# Patient Record
Sex: FEMALE | Race: BLACK OR AFRICAN AMERICAN | Employment: OTHER | URBAN - METROPOLITAN AREA
[De-identification: names, ages, dates, MRNs, and addresses within clinical notes are randomized per-mention and may not be internally consistent; named-entity substitution may affect disease eponyms.]

---

## 2019-02-19 ENCOUNTER — OFFICE VISIT (OUTPATIENT)
Dept: ORTHOPEDIC SURGERY | Facility: CLINIC | Age: 84
End: 2019-02-19

## 2019-02-19 VITALS
DIASTOLIC BLOOD PRESSURE: 71 MMHG | BODY MASS INDEX: 30 KG/M2 | SYSTOLIC BLOOD PRESSURE: 131 MMHG | WEIGHT: 163 LBS | TEMPERATURE: 96.9 F | HEART RATE: 87 BPM | OXYGEN SATURATION: 97 % | HEIGHT: 62 IN | RESPIRATION RATE: 16 BRPM

## 2019-02-19 DIAGNOSIS — G56.01 CARPAL TUNNEL SYNDROME, RIGHT: Primary | ICD-10-CM

## 2019-02-19 RX ORDER — PRAVASTATIN SODIUM 10 MG/1
TABLET ORAL
COMMUNITY
Start: 2018-12-03 | End: 2020-10-06 | Stop reason: SDUPTHER

## 2019-02-19 RX ORDER — VITAMINS A AND D
CAPSULE ORAL
COMMUNITY

## 2019-02-19 RX ORDER — SERTRALINE HYDROCHLORIDE 50 MG/1
TABLET, FILM COATED ORAL
COMMUNITY
Start: 2018-09-10 | End: 2020-09-15 | Stop reason: SDUPTHER

## 2019-02-19 RX ORDER — LANCETS
1 EACH MISCELLANEOUS
COMMUNITY
Start: 2014-10-27

## 2019-02-19 RX ORDER — BLOOD-GLUCOSE METER
EACH MISCELLANEOUS
COMMUNITY

## 2019-02-19 RX ORDER — TRIPROLIDINE/PSEUDOEPHEDRINE 2.5MG-60MG
1500 TABLET ORAL
COMMUNITY

## 2019-02-19 RX ORDER — METFORMIN HYDROCHLORIDE 850 MG/1
850 TABLET ORAL
COMMUNITY
Start: 2017-09-05 | End: 2019-08-13 | Stop reason: SDUPTHER

## 2019-02-19 RX ORDER — LANOLIN ALCOHOL/MO/W.PET/CERES
1000 CREAM (GRAM) TOPICAL
COMMUNITY

## 2019-02-19 RX ORDER — MECLIZINE HYDROCHLORIDE 25 MG/1
25 TABLET ORAL
COMMUNITY

## 2019-02-19 RX ORDER — PRAVASTATIN SODIUM 10 MG/1
TABLET ORAL
Refills: 0 | COMMUNITY
Start: 2018-12-03 | End: 2019-08-13 | Stop reason: SDUPTHER

## 2019-02-19 RX ORDER — ASPIRIN 325 MG
500 TABLET, DELAYED RELEASE (ENTERIC COATED) ORAL
COMMUNITY

## 2019-02-19 NOTE — PROGRESS NOTES
Lilia Justice is a 81 y.o. female right handed retiree former school . Worker's Compensation and legal considerations: not known. Vitals:  
 02/19/19 1318 BP: 131/71 Pulse: 87 Resp: 16 Temp: 96.9 °F (36.1 °C) TempSrc: Oral  
SpO2: 97% Weight: 163 lb (73.9 kg) Height: 5' 2\" (1.575 m) PainSc:   8 PainLoc: Hand Chief Complaint Patient presents with  
 Hand Pain Right hand pain HPI: Patient comes in today complaining of right hand numbness burning and pain. She reports is been going on for close to 2 weeks. She also reports a history of this in the past that she received injections for. She also has a brace at home that she has not been wearing but she is to wear in the past.  She says it is in good condition. She denies any injuries. Date of onset: Many years but most recently February 2019 Injury: No 
 
Prior Treatment:  Yes: Comment: She has had injections in the past by Dr. Breezy Wyatt at least 3-4 years ago she said Numbness/ Tingling: Yes: Comment: Right index middle and ring finger with no numbness on the left. ROS: Review of Systems - General ROS: negative Respiratory ROS: no cough, shortness of breath, or wheezing Cardiovascular ROS: no chest pain or dyspnea on exertion Musculoskeletal ROS: positive for - pain in hand - right Neurological ROS: positive for - numbness/tingling Dermatological ROS: negative History reviewed. No pertinent past medical history. History reviewed. No pertinent surgical history. Current Outpatient Medications Medication Sig Dispense Refill  glucose blood VI test strips (ASCENSIA AUTODISC VI, ONE TOUCH ULTRA TEST VI) strip 1 Each.  Blood-Glucose Meter misc by Misc. (Non-Drug; Combo Route) route.  cyanocobalamin 1,000 mcg tablet 1,000 mcg.  Glucosamine HCl 1,500 mg tab 1,500 mg.    
 lancets misc 1 Each.  meclizine (ANTIVERT) 25 mg tablet 25 mg.  metFORMIN (GLUCOPHAGE) 850 mg tablet 850 mg.    
 omega 3-dha-epa-fish oil (FISH OIL) 60- mg cap 500 mg.  pravastatin (PRAVACHOL) 10 mg tablet TAKE 1 TABLET BY MOUTH EVERY EVENING  peg 400-propylene glycol (SYSTANE) 0.4-0.3 % drop Instill  in eye.  sertraline (ZOLOFT) 50 mg tablet TAKE 1 TABLET BY MOUTH EVERY DAY  vitamins a & d cap Take  by Mouth Once a Day.  triamcinolone acetonide (KENALOG) 10 mg/mL injection 1 mL by IntraMUSCular route once for 1 dose. 1 Vial 0  
 pravastatin (PRAVACHOL) 10 mg tablet TK 1 T PO QPM  0 No Known Allergies PE:  
 
NEUROVASCULAR: There is significant thenar wasting as well as APB weakness of approximately 2-3 out of 5 on the right. There is no intrinsic wasting or weakness noted. Examination L R Examination L R Carpal Comp. - + Pronator Comp. - -  
Carpal Tinel - + Pronator Tinel - - Phalen's - + Pronator Stress - -  
Cubital Comp. - - Guyon Comp. - -  
Cubital Tinel - - Guyon Tinel - -  
Elbow Hyperflexion - - Adson's - - Spurling's - - SC Comp. - -  
PCB Median abn - - SC Tinel - - Radial Tinel - - IC Comp. - - Digital Tinel - - IC Tinel - - Radial 2-Pt WNL WNL Ulnar 2-Pt WNL WNL Radial Pulse: 2+ Capillary Refill: < 2 sec [place]: Not Performed Digital [place] Gent: Not Performed Imaging:  
 
None indicated today ICD-10-CM ICD-9-CM 1. Carpal tunnel syndrome, right G56.01 354.0 TRIAMCINOLONE ACETONIDE INJ  
   triamcinolone acetonide (KENALOG) 10 mg/mL injection INJECT CARPAL TUNNEL Plan:  
 
I had a discussion with the patient on the likely high-grade severity of her carpal tunnel syndrome considering how long she has been dealing with it as well as the physical exam findings. I told her that it would be a good idea to get a nerve study to quantify this. I also told her that it would be likely she would need surgery.   She said that she does not want surgery and injections have worked in the past.  She also says she does not want the EMG. She says she might be willing to do it later if an injection does not work. She would like an injection today. I told her this option is reasonable if she does not want surgery so we will give her an injection today I also told her that if she needs to come back for another injection we should get a nerve study first. 
 
Follow-up TERESSA Lynch NOTE Chart reviewed for the following: 
 Bill BENITES DO, have reviewed the History, Physical and updated the Allergic reactions for Sissy Field TIME OUT performed immediately prior to start of procedure: 
 Bill BENITES DO, have performed the following reviews on Sissy Field prior to the start of the procedure: 
         
* Patient was identified by name and date of birth * Agreement on procedure being performed was verified * Risks and Benefits explained to the patient * Procedure site verified and marked as necessary * Patient was positioned for comfort * Consent was signed and verified Time: 13:30 Date of procedure: 2/19/2019 Procedure performed by: Fox Teresa DO 
 
Provider assisted by: Christopher Naik LPN Patient assisted by: self How tolerated by patient: tolerated the procedure well with no complications Post Procedural Pain Scale: 0 - No Hurt Comments: none Procedure: After consent was obtained, using sterile technique the carpal tunnel was prepped. Local anesthetic used: 1% lidocaine. Kenalog 5 mg and was then injected and the needle withdrawn. The procedure was well tolerated. The patient is asked to continue to rest the area for a few more days before resuming regular activities. It may be more painful for the first 1-2 days.   Watch for fever, or increased swelling or persistent pain in the joint. Call or return to clinic prn if such symptoms occur or there is failure to improve as anticipated. Plan was reviewed with patient, who verbalized agreement and understanding of the plan

## 2019-02-19 NOTE — PATIENT INSTRUCTIONS
Carpal Tunnel Syndrome: Care Instructions Your Care Instructions Carpal tunnel syndrome is a nerve problem. It can cause tingling, numbness, weakness, or pain in the fingers, thumb, and hand. The median nerve and several tough tissues called tendons run through a space in the wrist called the carpal tunnel. The repeated hand motions used in work and some hobbies and sports can put pressure on the nerve. Pregnancy and several conditions, including diabetes, arthritis, and an underactive thyroid, also can cause carpal tunnel syndrome. You may be able to limit an activity or do it differently to reduce your symptoms. You also can take other steps to feel better. If your symptoms are mild, 1 to 2 weeks of home treatment are likely to ease your pain. Surgery is needed only if other treatments do not work. Follow-up care is a key part of your treatment and safety. Be sure to make and go to all appointments, and call your doctor if you are having problems. It's also a good idea to know your test results and keep a list of the medicines you take. How can you care for yourself at home? · If possible, stop or reduce the activity that causes your symptoms. If you cannot stop the activity, take frequent breaks to rest and stretch or change hand positions to do a task. Try switching hands, such as when using a computer mouse. · Try to avoid bending or twisting your wrists. · Ask your doctor if you can take an over-the-counter pain medicine, such as acetaminophen (Tylenol), ibuprofen (Advil, Motrin), or naproxen (Aleve). Be safe with medicines. Read and follow all instructions on the label. · If your doctor prescribes corticosteroid medicine to help reduce pain and swelling, take it exactly as prescribed. Call your doctor if you think you are having a problem with your medicine. · Put ice or a cold pack on your wrist for 10 to 20 minutes at a time to ease pain. Put a thin cloth between the ice and your skin. · If your doctor or your physical or occupational therapist tells you to wear a wrist splint, wear it as directed to keep your wrist in a neutral position. This also eases pressure on your median nerve. · Ask your doctor whether you should have physical or occupational therapy to learn how to do tasks differently. · Try a yoga class to stretch your muscles and build strength in your hands and wrists. Yoga has been shown to ease carpal tunnel symptoms. To prevent carpal tunnel · When working at a computer, keep your hands and wrists in line with your forearms. Hold your elbows close to your sides. Take a break every 10 to 15 minutes. · Try these exercises: 
? Warm up: Rotate your wrist up, down, and from side to side. Repeat this 4 times. Stretch your fingers far apart, relax them, then stretch them again. Repeat 4 times. Stretch your thumb by pulling it back gently, holding it, and then releasing it. Repeat 4 times. ? Prayer stretch: Start with your palms together in front of your chest just below your chin. Slowly lower your hands toward your waistline while keeping your hands close to your stomach and your palms together until you feel a mild to moderate stretch under your forearms. Hold for 10 to 20 seconds. Repeat 4 times. ? Wrist flexor stretch: Hold your arm in front of you with your palm up. Bend your wrist, pointing your hand toward the floor. With your other hand, gently bend your wrist further until you feel a mild to moderate stretch in your forearm. Hold for 10 to 20 seconds. Repeat 4 times. ? Wrist extensor stretch: Repeat the steps for the wrist flexor stretch, but begin with your extended hand palm down. · Squeeze a rubber exercise ball several times a day to keep your hands and fingers strong. · Avoid holding objects (such as a book) in one position for a long time. When possible, use your whole hand to grasp an object.  Using just the thumb and index finger can put stress on the wrist. 
 · Do not smoke. It can make this condition worse by reducing blood flow to the median nerve. If you need help quitting, talk to your doctor about stop-smoking programs and medicines. These can increase your chances of quitting for good. When should you call for help? Watch closely for changes in your health, and be sure to contact your doctor if: 
  · Your pain or other problems do not get better with home care.  
  · You want more information about physical or occupational therapy.  
  · You have side effects of your corticosteroid medicine, such as: 
? Weight gain. ? Mood changes. ? Trouble sleeping. ? Bruising easily.  
  · You have any other problems with your medicine. Where can you learn more? Go to http://parveen-gerald.info/. Enter R432 in the search box to learn more about \"Carpal Tunnel Syndrome: Care Instructions. \" Current as of: September 20, 2018 Content Version: 11.9 © 4893-1013 [employer]. Care instructions adapted under license by [employer] (which disclaims liability or warranty for this information). If you have questions about a medical condition or this instruction, always ask your healthcare professional. [place] [address] any warranty or liability for your use of this information.

## 2019-08-13 ENCOUNTER — OFFICE VISIT (OUTPATIENT)
Dept: FAMILY MEDICINE CLINIC | Facility: CLINIC | Age: 84
End: 2019-08-13

## 2019-08-13 VITALS
HEART RATE: 74 BPM | DIASTOLIC BLOOD PRESSURE: 80 MMHG | HEIGHT: 62 IN | BODY MASS INDEX: 29.37 KG/M2 | RESPIRATION RATE: 12 BRPM | SYSTOLIC BLOOD PRESSURE: 140 MMHG | WEIGHT: 159.6 LBS | OXYGEN SATURATION: 98 % | TEMPERATURE: 97.6 F

## 2019-08-13 DIAGNOSIS — E78.2 MIXED HYPERLIPIDEMIA: ICD-10-CM

## 2019-08-13 DIAGNOSIS — E55.9 VITAMIN D DEFICIENCY: ICD-10-CM

## 2019-08-13 DIAGNOSIS — E11.8 CONTROLLED TYPE 2 DIABETES MELLITUS WITH COMPLICATION, WITHOUT LONG-TERM CURRENT USE OF INSULIN (HCC): Primary | ICD-10-CM

## 2019-08-13 RX ORDER — METFORMIN HYDROCHLORIDE 850 MG/1
850 TABLET ORAL 2 TIMES DAILY
Qty: 60 TAB | Refills: 1 | Status: SHIPPED | OUTPATIENT
Start: 2019-08-13 | End: 2020-09-15 | Stop reason: SDUPTHER

## 2019-08-13 NOTE — PROGRESS NOTES
ROOM # 2    Arnold Salazar presents today for   Chief Complaint   Patient presents with    New Patient     sleeping issues    Establish Care     diabetes       Arnold Salazar preferred language for health care discussion is english/other. Is someone accompanying this pt? no    Is the patient using any DME equipment during [address]? no    Depression Screening:  3 most recent PHQ Screens 8/13/2019 2/19/2019   Little interest or pleasure in doing things Not at all Not at all   Feeling down, depressed, irritable, or hopeless Several days Not at all   Total Score PHQ 2 1 0       Learning Assessment:  No flowsheet data found. Abuse Screening:  No flowsheet data found. Fall Risk  Fall Risk Assessment, last 12 mths 8/13/2019 2/19/2019   Able to walk? Yes Yes   Fall in past 12 months? No No       Health Maintenance reviewed and discussed per provider. Yes    Arnold Salazar is due for   Health Maintenance Due   Topic Date Due    DTaP/Tdap/Td series (1 - Tdap) 10[birth year]/1957    Shingrix Vaccine Age 50> (1 of 2) 10/24/1986    GLAUCOMA SCREENING Q2Y  10/24/2001    Bone Densitometry (Dexa) Screening  10/24/2001    Pneumococcal 65+ years (1 of 2 - PCV13) 10/24/2001    MEDICARE YEARLY EXAM  03/20/2018    Influenza Age 9 to Adult  08/01/2019         Please order/place referral if appropriate. Advance Directive:  1. Do you have an advance directive in place? Patient Reply: no    2. If not, would you like material regarding how to put one in place? Patient Reply: no    Coordination of Care:  1. Have you been to the ER, urgent care clinic since your last visit? Hospitalized since your last visit? no    2. Have you seen or consulted any other health care providers outside of the [address] [place] [address] since your last visit? Include any pap smears or colon screening.  no

## 2019-08-13 NOTE — PROGRESS NOTES
Tiburcoi Dumont is a 81 y.o. female presenting today for New Patient (sleeping issues) and Establish Care (diabetes)    HPI:  Tiburcio Dumont presents to the office today to establish care with the practice. Patient has a PMH for diabetes, depression and hyperlipidemia. She also has a history of vertigo and has a rx for Meclizine. She presents today noting she feels well. She is negative for chest pain, palpation or dyspnea. She denies any polyuria, polyphagia or polydipsia and notes she is compliant with taking her Metformin 850 mg daily. The patient blood pressure is mildly elevated at 140/80 but she notes she is currently not treated for hypertension,      Review of Systems   Respiratory: Negative for cough and sputum production. Cardiovascular: Negative for chest pain and palpitations. Gastrointestinal: Negative for abdominal pain, diarrhea, nausea and vomiting. Musculoskeletal: Negative for myalgias. Neurological: Negative for dizziness and headaches. No Known Allergies    Current Outpatient Medications   Medication Sig Dispense Refill    metFORMIN (GLUCOPHAGE) 850 mg tablet Take 1 Tab by mouth two (2) times a day. 60 Tab 1    flash glucose scanning reader (FREESTYLE IZZY 14 DAY READER) misc 1 Each by Does Not Apply route two (2) times a day. 30 Each 2    flash glucose sensor (FREESTYLE IZZY 14 DAY SENSOR) kit 1 Each by Does Not Apply route two (2) times a day. 1 Kit 1    glucose blood VI test strips (ASCENSIA AUTODISC VI, ONE TOUCH ULTRA TEST VI) strip 1 Each.  Blood-Glucose Meter misc by Misc. (Non-Drug; Combo Route) route.  cyanocobalamin 1,000 mcg tablet 1,000 mcg.  Glucosamine HCl 1,500 mg tab 1,500 mg.      lancets misc 1 Each.  meclizine (ANTIVERT) 25 mg tablet 25 mg.      omega 3-dha-epa-fish oil (FISH OIL) 60- mg cap 500 mg.       pravastatin (PRAVACHOL) 10 mg tablet TAKE 1 TABLET BY MOUTH EVERY EVENING      peg 400-propylene glycol (SYSTANE) 0.4-0.3 % drop Instill  in eye.  sertraline (ZOLOFT) 50 mg tablet TAKE 1 TABLET BY MOUTH EVERY DAY      vitamins a & d cap Take  by Mouth Once a Day.          Past Medical History:   Diagnosis Date    Depression     Diabetes ([facility] [address]     Hypercholesterolemia        Past Surgical History:   Procedure Laterality Date    HX BREAST LUMPECTOMY Bilateral     HX HYSTERECTOMY         Social History     Socioeconomic History    Marital status:      Spouse name: Not on file    Number of children: Not on file    Years of education: Not on file    Highest education level: Not on file   Occupational History    Not on file   Social Needs    Financial resource strain: Not on file    Food insecurity:     Worry: Not on file     Inability: Not on file    Transportation needs:     Medical: Not on file     Non-medical: Not on file   Tobacco Use    Smoking status: Never Smoker    Smokeless tobacco: Never Used   Substance and Sexual Activity    Alcohol use: No     Frequency: Never    Drug use: No    Sexual activity: Not Currently   Lifestyle    Physical activity:     Days per week: Not on file     Minutes per session: Not on file    Stress: Not on file   Relationships    Social connections:     Talks on phone: Not on file     Gets together: Not on file     Attends [employer] service: Not on file     Active member of club or organization: Not on file     Attends meetings of clubs or organizations: Not on file     Relationship status: Not on file    Intimate partner violence:     Fear of current or ex partner: Not on file     Emotionally abused: Not on file     Physically abused: Not on file     Forced sexual activity: Not on file   Other Topics Concern    Not on file   Social History Narrative    Not on file       Patient does not have an advanced directive on file    Vitals:    08/13/19 1727   BP: 140/80   Pulse: 74   Resp: 12   Temp: 97.6 °F (36.4 °C)   TempSrc: Oral   SpO2: 98%   Weight: 159 lb 9.6 oz (72.4 kg) Height: 5' 2\" (1.575 m)   PainSc:   0 - No pain       Physical Exam   Constitutional: No distress. Cardiovascular: Normal rate, regular rhythm and normal heart sounds. Pulmonary/Chest: Effort normal and breath sounds normal.   Neurological: She is alert. Skin: Skin is warm and dry. Nursing note and vitals reviewed. No visits with results within 3 Month(s) from this visit. Latest known visit with results is:   Hospital Outpatient Visit on 09/03/2011   Component Date Value Ref Range Status    Sodium 09/03/2011 138  136 - 145 MMOL/L Final    Potassium 09/03/2011 3.8  3.5 - 5.5 MMOL/L Final    Chloride 09/03/2011 99* 100 - 108 MMOL/L Final    CO2 09/03/2011 31  21 - 32 MMOL/L Final    Anion gap 09/03/2011 8  5 - 15 mmol/L Final    Glucose 09/03/2011 106* 74 - 99 MG/DL Final    BUN 09/03/2011 14  7 - 18 MG/DL Final    Creatinine 09/03/2011 0.8  0.6 - 1.3 MG/DL Final    BUN/Creatinine ratio 09/03/2011 18  12 - 20   Final    GFR est AA 09/03/2011 >60  >60 ml/min/1.73m2 Final    GFR est non-AA 09/03/2011 >60  >60 ml/min/1.73m2 Final    Comment: (NOTE)                           Estimated GFR is calculated using the Modification of Diet in Renal                            Disease (MDRD) Study equation, reported for both  Americans                            (GFRAA) and non- Americans (GFRNA), and normalized to 1.73m2                            body surface area. The physician must decide which value applies to                            the patient. The MDRD study equation should only be used in                            individuals age 25 or older. It has not been validated for the                            following: pregnant women, patients with serious comorbid conditions,                            or on certain medications, or persons with extremes of body size,                            muscle mass, or nutritional status.     Calcium 09/03/2011 9.6  8.4 - 10.4 MG/DL Final  WBC 09/03/2011 10.4  4.6 - 13.2 K/uL Final    RBC 09/03/2011 4.66  4.20 - 5.30 M/uL Final    HGB 09/03/2011 13.0  12.0 - 16.0 g/dL Final    HCT 09/03/2011 38.5  35.0 - 45.0 % Final    MCV 09/03/2011 82.6  74.0 - 97.0 FL Final    MCH 09/03/2011 27.9  24.0 - 34.0 PG Final    MCHC 09/03/2011 33.8  31.0 - 37.0 g/dL Final    RDW 09/03/2011 14.0  11.6 - 14.5 % Final    PLATELET 36/35/4361 092  135 - 420 K/uL Final    MPV 09/03/2011 10.1  9.2 - 11.8 FL Final    NEUTROPHILS 09/03/2011 40  40 - 73 % Final    LYMPHOCYTES 09/03/2011 39  21 - 52 % Final    MONOCYTES 09/03/2011 15* 3 - 10 % Final    EOSINOPHILS 09/03/2011 5  0 - 5 % Final    BASOPHILS 09/03/2011 1  0 - 2 % Final    ABS. NEUTROPHILS 09/03/2011 4.2  1.8 - 8.0 K/UL Final    ABS. LYMPHOCYTES 09/03/2011 4.1* 0.9 - 3.6 K/UL Final    ABS. MONOCYTES 09/03/2011 1.5* 0.05 - 1.2 K/UL Final    ABS. EOSINOPHILS 09/03/2011 0.5* 0.0 - 0.4 K/UL Final    ABS.  BASOPHILS 09/03/2011 0.1  0.0 - 0.1 K/UL Final    DF 09/03/2011 AUTOMATED   Final    Color 09/03/2011 YELLOW   Final    Appearance 09/03/2011 HAZY   Final    Specific gravity 09/03/2011 >1.030* 1.003 - 1.030   Final    pH (UA) 09/03/2011 5.5  5.0 - 8.0   Final    Protein 09/03/2011 TRACE* NEGATIVE MG/DL Final    Glucose 09/03/2011 NEGATIVE   NEGATIVE MG/DL Final    Ketone 09/03/2011 15* NEGATIVE MG/DL Final    Bilirubin 09/03/2011 SMALL* NEGATIVE Final    Blood 09/03/2011 NEGATIVE   NEGATIVE Final    Urobilinogen 09/03/2011 1.0  0.2 - 1.0 EU/DL Final    Nitrites 09/03/2011 NEGATIVE   NEGATIVE Final    Leukocyte Esterase 09/03/2011 SMALL* NEGATIVE Final    Glucose (POC) 09/03/2011 119* 70 - 110 mg/dL Final    Bilirubin UA, confirm 09/03/2011 POSITIVE* NEGATIVE Final    WBC 09/03/2011 4 to 10  0 - 4 /HPF Final    RBC 09/03/2011 11 to 20  0 - 5 /HPF Final    Epithelial cells 09/03/2011 1+  0 - 5 /LPF Final    Bacteria 09/03/2011 1+* NEGATIVE /HPF Final    Mucus 09/03/2011 1+* NEGATIVE /LPF Final    CA Oxalate crystals 09/03/2011 1+* NEGATIVE   Final    Specimen Description: 09/03/2011 URINE   Final    Special Requests: 09/03/2011 NO SPECIAL REQUESTS   Final    Culture result: 09/03/2011    Final                    Value:[id number] COLONIES/mL MIXED GRAM POSITIVE TAQUERIA, PROBABLE SKIN/GENITAL CONTAMINATION. <10,000 COLONIES/mL GRAM NEGATIVE RODS    Report Status 09/03/2011 09/05/2011 FINAL   Final       .No results found for any visits on 08/13/19. Assessment / Plan:      ICD-10-CM ICD-9-CM    1. Controlled type 2 diabetes mellitus with complication, without long-term current use of insulin ([facility]) Q78.2 370.94 METABOLIC PANEL, COMPREHENSIVE      MICROALBUMIN, UR, RAND W/ MICROALB/CREAT RATIO      flash glucose scanning reader (FREESTYLE IZZY 14 DAY READER) misc      flash glucose sensor (FREESTYLE IZZY 14 DAY SENSOR) kit      HEMOGLOBIN A1C WITH EAG   2. Mixed hyperlipidemia E78.2 272.2 CBC WITH AUTOMATED DIFF      LIPID PANEL   3. Vitamin D deficiency E55.9 268.9 VITAMIN D, 25 HYDROXY       Fasting labs ordered  Freestyle Izzy order given   Office visit in 2 months    Follow-up and Dispositions    · Return in about 2 months (around 10/13/2019). I asked the patient if she  had any questions and answered her  questions. The patient stated that she understands the treatment plan and agrees with the treatment plan    This document was created with a voice activated dictation system and may contain transcription errors.

## 2019-09-16 ENCOUNTER — HOSPITAL ENCOUNTER (OUTPATIENT)
Dept: LAB | Age: 84
Discharge: HOME | End: 2019-09-16
Payer: MEDICARE

## 2019-09-16 DIAGNOSIS — E78.2 MIXED HYPERLIPIDEMIA: ICD-10-CM

## 2019-09-16 DIAGNOSIS — E11.8 CONTROLLED TYPE 2 DIABETES MELLITUS WITH COMPLICATION, WITHOUT LONG-TERM CURRENT USE OF INSULIN (HCC): ICD-10-CM

## 2019-09-16 DIAGNOSIS — E55.9 VITAMIN D DEFICIENCY: ICD-10-CM

## 2019-09-16 LAB
ALBUMIN SERPL-MCNC: 4.1 G/DL (ref 3.4–5)
ALBUMIN/GLOB SERPL: 1.2 {RATIO} (ref 0.8–1.7)
ALP SERPL-CCNC: 64 U/L (ref 45–117)
ALT SERPL-CCNC: 35 U/L (ref 13–56)
ANION GAP SERPL CALC-SCNC: 9 MMOL/L (ref 3–18)
AST SERPL-CCNC: 22 U/L (ref 10–38)
BASOPHILS # BLD: 0 K/UL (ref 0–0.1)
BASOPHILS NFR BLD: 0 % (ref 0–2)
BILIRUB SERPL-MCNC: 0.3 MG/DL (ref 0.2–1)
BUN SERPL-MCNC: 14 MG/DL (ref 7–18)
BUN/CREAT SERPL: 19 (ref 12–20)
CALCIUM SERPL-MCNC: 9.7 MG/DL (ref 8.5–10.1)
CHLORIDE SERPL-SCNC: 102 MMOL/L (ref 100–111)
CO2 SERPL-SCNC: 28 MMOL/L (ref 21–32)
CREAT SERPL-MCNC: 0.75 MG/DL (ref 0.6–1.3)
DIFFERENTIAL METHOD BLD: NORMAL
EOSINOPHIL # BLD: 0.3 K/UL (ref 0–0.4)
EOSINOPHIL NFR BLD: 4 % (ref 0–5)
ERYTHROCYTE [DISTWIDTH] IN BLOOD BY AUTOMATED COUNT: 13.1 % (ref 11.6–14.5)
EST. AVERAGE GLUCOSE BLD GHB EST-MCNC: 157 MG/DL
GLOBULIN SER CALC-MCNC: 3.3 G/DL (ref 2–4)
GLUCOSE SERPL-MCNC: 152 MG/DL (ref 74–99)
HBA1C MFR BLD: 7.1 % (ref 4.2–5.6)
HCT VFR BLD AUTO: 40.9 % (ref 35–45)
HGB BLD-MCNC: 13.3 G/DL (ref 12–16)
LYMPHOCYTES # BLD: 3.3 K/UL (ref 0.9–3.6)
LYMPHOCYTES NFR BLD: 44 % (ref 21–52)
MCH RBC QN AUTO: 28.9 PG (ref 24–34)
MCHC RBC AUTO-ENTMCNC: 32.5 G/DL (ref 31–37)
MCV RBC AUTO: 88.7 FL (ref 74–97)
MONOCYTES # BLD: 0.6 K/UL (ref 0.05–1.2)
MONOCYTES NFR BLD: 7 % (ref 3–10)
NEUTS SEG # BLD: 3.3 K/UL (ref 1.8–8)
NEUTS SEG NFR BLD: 45 % (ref 40–73)
PLATELET # BLD AUTO: 271 K/UL (ref 135–420)
PMV BLD AUTO: 10.9 FL (ref 9.2–11.8)
POTASSIUM SERPL-SCNC: 4.1 MMOL/L (ref 3.5–5.5)
PROT SERPL-MCNC: 7.4 G/DL (ref 6.4–8.2)
RBC # BLD AUTO: 4.61 M/UL (ref 4.2–5.3)
SODIUM SERPL-SCNC: 139 MMOL/L (ref 136–145)
WBC # BLD AUTO: 7.4 K/UL (ref 4.6–13.2)

## 2019-09-16 PROCEDURE — 85025 COMPLETE CBC W/AUTO DIFF WBC: CPT

## 2019-09-16 PROCEDURE — 80053 COMPREHEN METABOLIC PANEL: CPT

## 2019-09-16 PROCEDURE — 83036 HEMOGLOBIN GLYCOSYLATED A1C: CPT

## 2019-09-16 PROCEDURE — 82306 VITAMIN D 25 HYDROXY: CPT

## 2019-09-16 PROCEDURE — 36415 COLL VENOUS BLD VENIPUNCTURE: CPT

## 2019-09-16 PROCEDURE — 80061 LIPID PANEL: CPT

## 2019-09-16 PROCEDURE — 82043 UR ALBUMIN QUANTITATIVE: CPT

## 2019-09-17 ENCOUNTER — OFFICE VISIT (OUTPATIENT)
Dept: FAMILY MEDICINE CLINIC | Facility: CLINIC | Age: 84
End: 2019-09-17

## 2019-09-17 VITALS
TEMPERATURE: 98 F | HEART RATE: 85 BPM | RESPIRATION RATE: 12 BRPM | WEIGHT: 159 LBS | SYSTOLIC BLOOD PRESSURE: 103 MMHG | HEIGHT: 62 IN | DIASTOLIC BLOOD PRESSURE: 60 MMHG | BODY MASS INDEX: 29.26 KG/M2 | OXYGEN SATURATION: 97 %

## 2019-09-17 DIAGNOSIS — N32.81 OVERACTIVE BLADDER: ICD-10-CM

## 2019-09-17 DIAGNOSIS — Z00.00 MEDICARE ANNUAL WELLNESS VISIT, SUBSEQUENT: Primary | ICD-10-CM

## 2019-09-17 LAB
25(OH)D3 SERPL-MCNC: 42.1 NG/ML (ref 30–100)
CHOLEST SERPL-MCNC: 179 MG/DL
CREAT UR-MCNC: 227 MG/DL (ref 30–125)
HDLC SERPL-MCNC: 38 MG/DL (ref 40–60)
HDLC SERPL: 4.7 {RATIO} (ref 0–5)
LDLC SERPL CALC-MCNC: 90.2 MG/DL (ref 0–100)
LIPID PROFILE,FLP: ABNORMAL
MICROALBUMIN UR-MCNC: 1.13 MG/DL (ref 0–3)
MICROALBUMIN/CREAT UR-RTO: 5 MG/G (ref 0–30)
TRIGL SERPL-MCNC: 254 MG/DL (ref ?–150)
VLDLC SERPL CALC-MCNC: 50.8 MG/DL

## 2019-09-17 NOTE — PROGRESS NOTES
This is the Subsequent Medicare Annual Wellness Exam, performed 12 months or more after the Initial AWV or the last Subsequent AWV    I have reviewed the patient's medical history in detail and updated the computerized patient record. History     Past Medical History:   Diagnosis Date    Depression     Diabetes ([address]     Hypercholesterolemia       Past Surgical History:   Procedure Laterality Date    HX BREAST LUMPECTOMY Bilateral     HX HYSTERECTOMY       Current Outpatient Medications   Medication Sig Dispense Refill    mirabegron ER (MYRBETRIQ) 25 mg ER tablet Take 1 Tab by mouth daily. 30 Tab 3    metFORMIN (GLUCOPHAGE) 850 mg tablet Take 1 Tab by mouth two (2) times a day. 60 Tab 1    cyanocobalamin 1,000 mcg tablet 1,000 mcg.  Glucosamine HCl 1,500 mg tab 1,500 mg.      omega 3-dha-epa-fish oil (FISH OIL) 60- mg cap 500 mg.  peg 400-propylene glycol (SYSTANE) 0.4-0.3 % drop Instill  in eye.  sertraline (ZOLOFT) 50 mg tablet TAKE 1 TABLET BY MOUTH EVERY DAY      vitamins a & d cap Take  by Mouth Once a Day.  flash glucose scanning reader (FREESTYLE IZZY 14 DAY READER) misc 1 Each by Does Not Apply route two (2) times a day. 30 Each 2    flash glucose sensor (FREESTYLE IZZY 14 DAY SENSOR) kit 1 Each by Does Not Apply route two (2) times a day. 1 Kit 1    glucose blood VI test strips (ASCENSIA AUTODISC VI, ONE TOUCH ULTRA TEST VI) strip 1 Each.  Blood-Glucose Meter misc by Misc. (Non-Drug; Combo Route) route.  lancets misc 1 Each.  meclizine (ANTIVERT) 25 mg tablet 25 mg.  pravastatin (PRAVACHOL) 10 mg tablet TAKE 1 TABLET BY MOUTH EVERY EVENING       No Known Allergies  History reviewed. No pertinent family history. Social History     Tobacco Use    Smoking status: Never Smoker    Smokeless tobacco: Never Used   Substance Use Topics    Alcohol use: No     Frequency: Never     There is no problem list on file for this patient.       Depression Risk Factor Screening:     3 most recent PHQ Screens 9/17/2019   Little interest or pleasure in doing things Several days   Feeling down, depressed, irritable, or hopeless Several days   Total Score PHQ 2 2     Alcohol Risk Factor Screening: You do not drink alcohol or very rarely. Functional Ability and Level of Safety:   Hearing Loss  Hearing is good. Activities of Daily Living  The home contains: no safety equipment. Patient does total self care    Fall Risk  Fall Risk Assessment, last 12 mths 9/17/2019   Able to walk? Yes   Fall in past 12 months? No       Abuse Screen  Patient is not abused    Cognitive Screening   Evaluation of Cognitive Function:  Has your family/caregiver stated any concerns about your memory: no  Normal    Patient Care Team   Patient Care Team:  Eric Harley NP as PCP - General (Nurse Practitioner)    Assessment/Plan   Education and counseling provided:  Are appropriate based on today's review and evaluation    Diagnoses and all orders for this visit:    1. Medicare annual wellness visit, subsequent    2. Overactive bladder  -     mirabegron ER (MYRBETRIQ) 25 mg ER tablet; Take 1 Tab by mouth daily. Health Maintenance Due   Topic Date Due    FOOT EXAM Q1  10/24/1945    EYE EXAM RETINAL OR DILATED  10/24/1945    DTaP/Tdap/Td series (1 - Tdap) 10/24/1957    Shingrix Vaccine Age 50> (1 of 2) 10/24/1986    GLAUCOMA SCREENING Q2Y  10/24/2001    Bone Densitometry (Dexa) Screening  10/24/2001    Pneumococcal 65+ years (1 of 2 - PCV13) 10/24/2001    Influenza Age 5 to Adult  08/01/2019     Debi Marsh is a 81 y.o. female presenting today for Labs (results) and Annual Wellness Visit  . HPI:  Debi Marsh presents to the office today for   Debi Marsh is a 81 y.o. female presenting today for Labs (results) and Annual Wellness Visit    HPI:  Debi Marsh presents to the office today to establish care with the practice.   Patient has a PMH for diabetes, depression and hyperlipidemia. She also has a history of vertigo and has a rx for Meclizine. She presents today noting she feels well. She is negative for chest pain, palpation or dyspnea. She denies any polyuria, polyphagia or polydipsia and notes she is compliant with taking her Metformin 850 mg daily. The patient blood pressure is mildly elevated at 140/80 but she notes she is currently not treated for hypertension, her hypertension is diet-controlled. Patient does take Zoloft daily for depression reports her symptoms are controlled. Patient is complaining of urinary frequency. She notes she uses the bathroom approximately 5-6 times each night. Review of Systems   Respiratory: Negative for cough and sputum production. Cardiovascular: Negative for chest pain and palpitations. Gastrointestinal: Negative for abdominal pain, diarrhea, nausea and vomiting. Musculoskeletal: Negative for myalgias. Neurological: Negative for dizziness and headaches. No Known Allergies    Current Outpatient Medications   Medication Sig Dispense Refill    mirabegron ER (MYRBETRIQ) 25 mg ER tablet Take 1 Tab by mouth daily. 30 Tab 3    metFORMIN (GLUCOPHAGE) 850 mg tablet Take 1 Tab by mouth two (2) times a day. 60 Tab 1    cyanocobalamin 1,000 mcg tablet 1,000 mcg.  Glucosamine HCl 1,500 mg tab 1,500 mg.      omega 3-dha-epa-fish oil (FISH OIL) 60- mg cap 500 mg.  peg 400-propylene glycol (SYSTANE) 0.4-0.3 % drop Instill  in eye.  sertraline (ZOLOFT) 50 mg tablet TAKE 1 TABLET BY MOUTH EVERY DAY      vitamins a & d cap Take  by Mouth Once a Day.  flash glucose scanning reader (FREESTYLE IZZY 14 DAY READER) misc 1 Each by Does Not Apply route two (2) times a day. 30 Each 2    flash glucose sensor (FREESTYLE IZZY 14 DAY SENSOR) kit 1 Each by Does Not Apply route two (2) times a day. 1 Kit 1    glucose blood VI test strips (ASCENSIA AUTODISC VI, ONE TOUCH ULTRA TEST VI) strip 1 Each.       Blood-Glucose Meter misc by Misc. (Non-Drug; Combo Route) route.  lancets misc 1 Each.  meclizine (ANTIVERT) 25 mg tablet 25 mg.       pravastatin (PRAVACHOL) 10 mg tablet TAKE 1 TABLET BY MOUTH EVERY EVENING         Past Medical History:   Diagnosis Date    Depression     Diabetes ([facility] [address]     Hypercholesterolemia        Past Surgical History:   Procedure Laterality Date    HX BREAST LUMPECTOMY Bilateral     HX HYSTERECTOMY         Social History     Socioeconomic History    Marital status:      Spouse name: Not on file    Number of children: Not on file    Years of education: Not on file    Highest education level: Not on file   Occupational History    Not on file   Social Needs    Financial resource strain: Not on file    Food insecurity:     Worry: Not on file     Inability: Not on file    Transportation needs:     Medical: Not on file     Non-medical: Not on file   Tobacco Use    Smoking status: Never Smoker    Smokeless tobacco: Never Used   Substance and Sexual Activity    Alcohol use: No     Frequency: Never    Drug use: No    Sexual activity: Not Currently   Lifestyle    Physical activity:     Days per week: Not on file     Minutes per session: Not on file    Stress: Not on file   Relationships    Social connections:     Talks on phone: Not on file     Gets together: Not on file     Attends [employer] service: Not on file     Active member of club or organization: Not on file     Attends meetings of clubs or organizations: Not on file     Relationship status: Not on file    Intimate partner violence:     Fear of current or ex partner: Not on file     Emotionally abused: Not on file     Physically abused: Not on file     Forced sexual activity: Not on file   Other Topics Concern    Not on file   Social History Narrative    Not on file       Patient does not have an advanced directive on file    Vitals:    09/17/19 1327   BP: 103/60   Pulse: 85   Resp: 12   Temp: 98 °F (36.7 °C)   TempSrc: Oral   SpO2: 97%   Weight: 159 lb (72.1 kg)   Height: 5' 2\" (1.575 m)   PainSc:   0 - No pain       Physical Exam   Constitutional: No distress. Cardiovascular: Normal rate, regular rhythm and normal heart sounds. Pulmonary/Chest: Effort normal and breath sounds normal.   Neurological: She is alert. Skin: Skin is warm and dry. Nursing note and vitals reviewed. Hospital Outpatient Visit on 09/16/2019   Component Date Value Ref Range Status    WBC 09/16/2019 7.4  4.6 - 13.2 K/uL Final    RBC 09/16/2019 4.61  4.20 - 5.30 M/uL Final    HGB 09/16/2019 13.3  12.0 - 16.0 g/dL Final    HCT 09/16/2019 40.9  35.0 - 45.0 % Final    MCV 09/16/2019 88.7  74.0 - 97.0 FL Final    MCH 09/16/2019 28.9  24.0 - 34.0 PG Final    MCHC 09/16/2019 32.5  31.0 - 37.0 g/dL Final    RDW 09/16/2019 13.1  11.6 - 14.5 % Final    PLATELET 72/05/1618 060  135 - 420 K/uL Final    MPV 09/16/2019 10.9  9.2 - 11.8 FL Final    NEUTROPHILS 09/16/2019 45  40 - 73 % Final    LYMPHOCYTES 09/16/2019 44  21 - 52 % Final    MONOCYTES 09/16/2019 7  3 - 10 % Final    EOSINOPHILS 09/16/2019 4  0 - 5 % Final    BASOPHILS 09/16/2019 0  0 - 2 % Final    ABS. NEUTROPHILS 09/16/2019 3.3  1.8 - 8.0 K/UL Final    ABS. LYMPHOCYTES 09/16/2019 3.3  0.9 - 3.6 K/UL Final    ABS. MONOCYTES 09/16/2019 0.6  0.05 - 1.2 K/UL Final    ABS. EOSINOPHILS 09/16/2019 0.3  0.0 - 0.4 K/UL Final    ABS. BASOPHILS 09/16/2019 0.0  0.0 - 0.1 K/UL Final    DF 09/16/2019 AUTOMATED    Final    LIPID PROFILE 09/16/2019        Final    Cholesterol, total 09/16/2019 179  <200 MG/DL Final    Triglyceride 09/16/2019 254* <150 MG/DL Final    Comment: The drugs N-acetylcysteine (NAC) and  Metamiszole have been found to cause falsely  low results in this chemical assay. Please  be sure to submit blood samples obtained  BEFORE administration of either of these  drugs to assure correct results.       HDL Cholesterol 09/16/2019 38* 40 - 60 MG/DL Final    LDL, calculated 09/16/2019 90.2  0 - 100 MG/DL Final    VLDL, calculated 09/16/2019 50.8  MG/DL Final    CHOL/HDL Ratio 09/16/2019 4.7  0 - 5.0   Final    Sodium 09/16/2019 139  136 - 145 mmol/L Final    Potassium 09/16/2019 4.1  3.5 - 5.5 mmol/L Final    Chloride 09/16/2019 102  100 - 111 mmol/L Final    CO2 09/16/2019 28  21 - 32 mmol/L Final    Anion gap 09/16/2019 9  3.0 - 18 mmol/L Final    Glucose 09/16/2019 152* 74 - 99 mg/dL Final    BUN 09/16/2019 14  7.0 - 18 MG/DL Final    Creatinine 09/16/2019 0.75  0.6 - 1.3 MG/DL Final    BUN/Creatinine ratio 09/16/2019 19  12 - 20   Final    GFR est AA 09/16/2019 >60  >60 ml/min/1.73m2 Final    GFR est non-AA 09/16/2019 >60  >60 ml/min/1.73m2 Final    Comment: (NOTE)  Estimated GFR is calculated using the Modification of Diet in Renal   Disease (MDRD) Study equation, reported for both  Americans   (GFRAA) and non- Americans (GFRNA), and normalized to 1.73m2   body surface area. The physician must decide which value applies to   the patient. The MDRD study equation should only be used in   individuals age 25 or older. It has not been validated for the   following: pregnant women, patients with serious comorbid conditions,   or on certain medications, or persons with extremes of body size,   muscle mass, or nutritional status.  Calcium 09/16/2019 9.7  8.5 - 10.1 MG/DL Final    Bilirubin, total 09/16/2019 0.3  0.2 - 1.0 MG/DL Final    ALT (SGPT) 09/16/2019 35  13 - 56 U/L Final    AST (SGOT) 09/16/2019 22  10 - 38 U/L Final    Alk.  phosphatase 09/16/2019 64  45 - 117 U/L Final    Protein, total 09/16/2019 7.4  6.4 - 8.2 g/dL Final    Albumin 09/16/2019 4.1  3.4 - 5.0 g/dL Final    Globulin 09/16/2019 3.3  2.0 - 4.0 g/dL Final    A-G Ratio 09/16/2019 1.2  0.8 - 1.7   Final    Microalbumin,urine random 09/16/2019 1.13  0 - 3.0 MG/DL Final    Creatinine, urine 09/16/2019 227.00* 30 - 125 mg/dL Final    Microalbumin/Creat ratio (mg/g cre* 09/16/2019 5  0 - 30 mg/g Final    Vitamin D 25-Hydroxy 09/16/2019 42.1  30 - 100 ng/mL Final    Comment: (NOTE)  Deficiency               <20 ng/mL  Insufficiency          20-30 ng/mL  Sufficient             ng/mL  Possible toxicity       >100 ng/mL    The Method used is Siemens Advia Centaur currently standardized to a   Center of Disease Control and Prevention (CDC) certified reference   [address]. Samples containing fluorescein dye can produce falsely   elevated values when tested with the ADVIA Centaur Vitamin D Assay. It is recommended that results in the toxic range, >100 ng/mL, be   retested 72 hours post fluorescein exposure.  Hemoglobin A1c 09/16/2019 7.1* 4.2 - 5.6 % Final    Comment: (NOTE)  HbA1C Interpretive Ranges  <5.7              Normal  5.7 - 6.4         Consider Prediabetes  >6.5              Consider Diabetes      Est. average glucose 09/16/2019 157  mg/dL Final    Comment: (NOTE)  The eAG should be interpreted with patient characteristics in mind   since ethnicity, interindividual differences, red cell lifespan,   variation in rates of glycation, etc. may affect the validity of the   calculation. .No results found for any visits on 09/17/19. Assessment / Plan:      ICD-10-CM ICD-9-CM    1. Medicare annual wellness visit, subsequent Z00.00 V70.0    2. Overactive bladder N32.81 596.51 mirabegron ER (MYRBETRIQ) 25 mg ER tablet     Fasting labs ordered  Freestyle Zuleyma order given   Office visit in 2 months    Follow-up and Dispositions    · Return in about 4 months (around 1/17/2020), or if symptoms worsen or fail to improve. I asked the patient if she  had any questions and answered her  questions. The patient stated that she understands the treatment plan and agrees with the treatment plan    This document was created with a voice activated dictation system and may contain transcription errors.        ROS    No Known Allergies    Current Outpatient Medications   Medication Sig Dispense Refill    mirabegron ER (MYRBETRIQ) 25 mg ER tablet Take 1 Tab by mouth daily. 30 Tab 3    metFORMIN (GLUCOPHAGE) 850 mg tablet Take 1 Tab by mouth two (2) times a day. 60 Tab 1    cyanocobalamin 1,000 mcg tablet 1,000 mcg.  Glucosamine HCl 1,500 mg tab 1,500 mg.      omega 3-dha-epa-fish oil (FISH OIL) 60- mg cap 500 mg.  peg 400-propylene glycol (SYSTANE) 0.4-0.3 % drop Instill  in eye.  sertraline (ZOLOFT) 50 mg tablet TAKE 1 TABLET BY MOUTH EVERY DAY      vitamins a & d cap Take  by Mouth Once a Day.  flash glucose scanning reader (FREESTYLE IZZY 14 DAY READER) misc 1 Each by Does Not Apply route two (2) times a day. 30 Each 2    flash glucose sensor (FREESTYLE IZZY 14 DAY SENSOR) kit 1 Each by Does Not Apply route two (2) times a day. 1 Kit 1    glucose blood VI test strips (ASCENSIA AUTODISC VI, ONE TOUCH ULTRA TEST VI) strip 1 Each.  Blood-Glucose Meter misc by Misc. (Non-Drug; Combo Route) route.  lancets misc 1 Each.  meclizine (ANTIVERT) 25 mg tablet 25 mg.       pravastatin (PRAVACHOL) 10 mg tablet TAKE 1 TABLET BY MOUTH EVERY EVENING         Past Medical History:   Diagnosis Date    Depression     Diabetes ([facility] [address]     Hypercholesterolemia        Past Surgical History:   Procedure Laterality Date    HX BREAST LUMPECTOMY Bilateral     HX HYSTERECTOMY         Social History     Socioeconomic History    Marital status:      Spouse name: Not on file    Number of children: Not on file    Years of education: Not on file    Highest education level: Not on file   Occupational History    Not on file   Social Needs    Financial resource strain: Not on file    Food insecurity:     Worry: Not on file     Inability: Not on file    Transportation needs:     Medical: Not on file     Non-medical: Not on file   Tobacco Use    Smoking status: Never Smoker    Smokeless tobacco: Never Used   Substance and Sexual Activity    Alcohol use: No     Frequency: Never    Drug use: No    Sexual activity: Not Currently   Lifestyle    Physical activity:     Days per week: Not on file     Minutes per session: Not on file    Stress: Not on file   Relationships    Social connections:     Talks on phone: Not on file     Gets together: Not on file     Attends [employer] service: Not on file     Active member of club or organization: Not on file     Attends meetings of clubs or organizations: Not on file     Relationship status: Not on file    Intimate partner violence:     Fear of current or ex partner: Not on file     Emotionally abused: Not on file     Physically abused: Not on file     Forced sexual activity: Not on file   Other Topics Concern    Not on file   Social History Narrative    Not on file       Patient does not have an advanced directive on file    Vitals:    09/17/19 1327   BP: 103/60   Pulse: 85   Resp: 12   Temp: 98 °F (36.7 °C)   TempSrc: Oral   SpO2: 97%   Weight: 159 lb (72.1 kg)   Height: 5' 2\" (1.575 m)   PainSc:   0 - No pain       Physical Exam   Constitutional: She is oriented to person, place, and time. No distress. Cardiovascular: Normal rate, regular rhythm and normal heart sounds. Pulmonary/Chest: Effort normal and breath sounds normal.   Abdominal: Soft. Musculoskeletal: She exhibits no edema. Neurological: She is alert and oriented to person, place, and time. Nursing note and vitals reviewed.       Hospital Outpatient Visit on 09/16/2019   Component Date Value Ref Range Status    WBC 09/16/2019 7.4  4.6 - 13.2 K/uL Final    RBC 09/16/2019 4.61  4.20 - 5.30 M/uL Final    HGB 09/16/2019 13.3  12.0 - 16.0 g/dL Final    HCT 09/16/2019 40.9  35.0 - 45.0 % Final    MCV 09/16/2019 88.7  74.0 - 97.0 FL Final    MCH 09/16/2019 28.9  24.0 - 34.0 PG Final    MCHC 09/16/2019 32.5  31.0 - 37.0 g/dL Final    RDW 09/16/2019 13.1  11.6 - 14.5 % Final    PLATELET 50/43/8357 581  135 - 420 K/uL Final  MPV 09/16/2019 10.9  9.2 - 11.8 FL Final    NEUTROPHILS 09/16/2019 45  40 - 73 % Final    LYMPHOCYTES 09/16/2019 44  21 - 52 % Final    MONOCYTES 09/16/2019 7  3 - 10 % Final    EOSINOPHILS 09/16/2019 4  0 - 5 % Final    BASOPHILS 09/16/2019 0  0 - 2 % Final    ABS. NEUTROPHILS 09/16/2019 3.3  1.8 - 8.0 K/UL Final    ABS. LYMPHOCYTES 09/16/2019 3.3  0.9 - 3.6 K/UL Final    ABS. MONOCYTES 09/16/2019 0.6  0.05 - 1.2 K/UL Final    ABS. EOSINOPHILS 09/16/2019 0.3  0.0 - 0.4 K/UL Final    ABS. BASOPHILS 09/16/2019 0.0  0.0 - 0.1 K/UL Final    DF 09/16/2019 AUTOMATED    Final    LIPID PROFILE 09/16/2019        Final    Cholesterol, total 09/16/2019 179  <200 MG/DL Final    Triglyceride 09/16/2019 254* <150 MG/DL Final    Comment: The drugs N-acetylcysteine (NAC) and  Metamiszole have been found to cause falsely  low results in this chemical assay. Please  be sure to submit blood samples obtained  BEFORE administration of either of these  drugs to assure correct results.       HDL Cholesterol 09/16/2019 38* 40 - 60 MG/DL Final    LDL, calculated 09/16/2019 90.2  0 - 100 MG/DL Final    VLDL, calculated 09/16/2019 50.8  MG/DL Final    CHOL/HDL Ratio 09/16/2019 4.7  0 - 5.0   Final    Sodium 09/16/2019 139  136 - 145 mmol/L Final    Potassium 09/16/2019 4.1  3.5 - 5.5 mmol/L Final    Chloride 09/16/2019 102  100 - 111 mmol/L Final    CO2 09/16/2019 28  21 - 32 mmol/L Final    Anion gap 09/16/2019 9  3.0 - 18 mmol/L Final    Glucose 09/16/2019 152* 74 - 99 mg/dL Final    BUN 09/16/2019 14  7.0 - 18 MG/DL Final    Creatinine 09/16/2019 0.75  0.6 - 1.3 MG/DL Final    BUN/Creatinine ratio 09/16/2019 19  12 - 20   Final    GFR est AA 09/16/2019 >60  >60 ml/min/1.73m2 Final    GFR est non-AA 09/16/2019 >60  >60 ml/min/1.73m2 Final    Comment: (NOTE)  Estimated GFR is calculated using the Modification of Diet in Renal   Disease (MDRD) Study equation, reported for both  Americans   (GFRAA) and non- Americans (GFRNA), and normalized to 1.73m2   body surface area. The physician must decide which value applies to   the patient. The MDRD study equation should only be used in   individuals age 25 or older. It has not been validated for the   following: pregnant women, patients with serious comorbid conditions,   or on certain medications, or persons with extremes of body size,   muscle mass, or nutritional status.  Calcium 09/16/2019 9.7  8.5 - 10.1 MG/DL Final    Bilirubin, total 09/16/2019 0.3  0.2 - 1.0 MG/DL Final    ALT (SGPT) 09/16/2019 35  13 - 56 U/L Final    AST (SGOT) 09/16/2019 22  10 - 38 U/L Final    Alk. phosphatase 09/16/2019 64  45 - 117 U/L Final    Protein, total 09/16/2019 7.4  6.4 - 8.2 g/dL Final    Albumin 09/16/2019 4.1  3.4 - 5.0 g/dL Final    Globulin 09/16/2019 3.3  2.0 - 4.0 g/dL Final    A-G Ratio 09/16/2019 1.2  0.8 - 1.7   Final    Microalbumin,urine random 09/16/2019 1.13  0 - 3.0 MG/DL Final    Creatinine, urine 09/16/2019 227.00* 30 - 125 mg/dL Final    Microalbumin/Creat ratio (mg/g cre* 09/16/2019 5  0 - 30 mg/g Final    Vitamin D 25-Hydroxy 09/16/2019 42.1  30 - 100 ng/mL Final    Comment: (NOTE)  Deficiency               <20 ng/mL  Insufficiency          20-30 ng/mL  Sufficient             ng/mL  Possible toxicity       >100 ng/mL    The Method used is Siemens Advia Centaur currently standardized to a   Center of Disease Control and Prevention (CDC) certified reference   [address]. Samples containing fluorescein dye can produce falsely   elevated values when tested with the ADVIA Centaur Vitamin D Assay. It is recommended that results in the toxic range, >100 ng/mL, be   retested 72 hours post fluorescein exposure.       Hemoglobin A1c 09/16/2019 7.1* 4.2 - 5.6 % Final    Comment: (NOTE)  HbA1C Interpretive Ranges  <5.7              Normal  5.7 - 6.4         Consider Prediabetes  >6.5              Consider Diabetes      Est. average glucose 09/16/2019 157  mg/dL Final    Comment: (NOTE)  The eAG should be interpreted with patient characteristics in mind   since ethnicity, interindividual differences, red cell lifespan,   variation in rates of glycation, etc. may affect the validity of the   calculation. .No results found for any visits on 09/17/19. Assessment / Plan:      ICD-10-CM ICD-9-CM    1. Medicare annual wellness visit, subsequent Z00.00 V70.0    2. Overactive bladder N32.81 596.51 mirabegron ER (MYRBETRIQ) 25 mg ER tablet         Follow-up and Dispositions    · Return in about 4 months (around 1/17/2020), or if symptoms worsen or fail to improve. I asked the patient if she  had any questions and answered her  questions. The patient stated that she understands the treatment plan and agrees with the treatment plan    This document was created with a voice activated dictation system and may contain transcription errors.

## 2019-09-17 NOTE — PATIENT INSTRUCTIONS
Medicare Wellness Visit, Female     The best way to live healthy is to have a lifestyle where you eat a well-balanced diet, exercise regularly, limit alcohol use, and quit all forms of tobacco/nicotine, if applicable. Regular preventive services are another way to keep healthy. Preventive services (vaccines, screening tests, monitoring & exams) can help personalize your care plan, which helps you manage your own care. Screening tests can find health problems at the earliest stages, when they are easiest to treat. [facility] follows the current, evidence-based guidelines published by the [employer] ([facility]) when recommending preventive services for our patients. Because we follow these guidelines, sometimes recommendations change over time as research supports it. (For example, mammograms used to be recommended annually. Even though Medicare will still pay for an annual mammogram, the newer guidelines recommend a mammogram every two years for women of average risk.)  Of course, you and your doctor may decide to screen more often for some diseases, based on your risk and your health status. Preventive services for you include:  - Medicare offers their members a free annual wellness visit, which is time for you and your primary care provider to discuss and plan for your preventive service needs. Take advantage of this benefit every year!  -All adults over the age of 72 should receive the recommended pneumonia vaccines. Current USPSTF guidelines recommend a series of two vaccines for the best pneumonia protection.   -All adults should have a flu vaccine yearly and a tetanus vaccine every 10 years. All adults age 61 and older should receive a shingles vaccine once in their lifetime.    -A bone mass density test is recommended when a woman turns 65 to screen for osteoporosis. This test is only recommended one time, as a screening.  Some providers will use this same test as a disease monitoring tool if you already have osteoporosis. -All adults age 38-68 who are overweight should have a diabetes screening test once every three years.   -Other screening tests and preventive services for persons with diabetes include: an eye exam to screen for diabetic retinopathy, a kidney function test, a foot exam, and stricter control over your cholesterol.   -Cardiovascular screening for adults with routine risk involves an electrocardiogram (ECG) at intervals determined by your doctor.   -Colorectal cancer screenings should be done for adults age 54-65 with no increased risk factors for colorectal cancer. There are a number of acceptable methods of screening for this type of cancer. Each test has its own benefits and drawbacks. Discuss with your doctor what is most appropriate for you during your annual wellness visit. The different tests include: colonoscopy (considered the best screening method), a fecal occult blood test, a fecal DNA test, and sigmoidoscopy. -Breast cancer screenings are recommended every other year for women of normal risk, age 54-69.  -Cervical cancer screenings for women over age 72 are only recommended with certain risk factors.   -All adults born between [facility] should be screened once for Hepatitis C.      Here is a list of your current Health Maintenance items (your personalized list of preventive services) with a due date:  Health Maintenance Due   Topic Date Due    Diabetic Foot Care  10/24/1945    Eye Exam  10/24/1945    DTaP/Tdap/Td  (1 - Tdap) 10/24/1957    Shingles Vaccine (1 of 2) 10/24/1986    Glaucoma Screening   10/24/2001    Bone Mineral Density   10/24/2001    Pneumococcal Vaccine (1 of 2 - PCV13) 10/24/2001    Annual Well Visit  03/20/2018    Flu Vaccine  08/01/2019

## 2020-01-14 ENCOUNTER — OFFICE VISIT (OUTPATIENT)
Dept: FAMILY MEDICINE CLINIC | Facility: CLINIC | Age: 85
End: 2020-01-14

## 2020-01-14 VITALS
DIASTOLIC BLOOD PRESSURE: 59 MMHG | SYSTOLIC BLOOD PRESSURE: 121 MMHG | HEIGHT: 62 IN | BODY MASS INDEX: 27.79 KG/M2 | HEART RATE: 83 BPM | RESPIRATION RATE: 12 BRPM | TEMPERATURE: 96.4 F | WEIGHT: 151 LBS | OXYGEN SATURATION: 97 %

## 2020-01-14 DIAGNOSIS — Z12.31 ENCOUNTER FOR SCREENING MAMMOGRAM FOR MALIGNANT NEOPLASM OF BREAST: ICD-10-CM

## 2020-01-14 DIAGNOSIS — E78.2 MIXED HYPERLIPIDEMIA: ICD-10-CM

## 2020-01-14 DIAGNOSIS — E11.8 CONTROLLED TYPE 2 DIABETES MELLITUS WITH COMPLICATION, WITHOUT LONG-TERM CURRENT USE OF INSULIN (HCC): Primary | ICD-10-CM

## 2020-01-14 DIAGNOSIS — Z12.39 BREAST CANCER SCREENING: ICD-10-CM

## 2020-01-14 LAB — HBA1C MFR BLD HPLC: 6.1 %

## 2020-01-14 NOTE — PROGRESS NOTES
Visit Vitals  /59   Pulse 83   Temp 96.4 °F (35.8 °C) (Oral)   Resp 12   Ht 5' 2\" (1.575 m)   Wt 151 lb (68.5 kg)   LMP  (LMP Unknown)   SpO2 97%   BMI 27.62 kg/m²     Kel Aguirre presents today for   Chief Complaint   Patient presents with    Diabetes     follow-up       Is someone accompanying this pt? no    Is the patient using any DME equipment during [address]? no    Depression Screening:  3 most recent PHQ Screens 1/14/2020   Little interest or pleasure in doing things Not at all   Feeling down, depressed, irritable, or hopeless Not at all   Total Score PHQ 2 0       Learning Assessment:  No flowsheet data found. Abuse Screening:  No flowsheet data found. Fall Risk  Fall Risk Assessment, last 12 mths 1/14/2020   Able to walk? Yes   Fall in past 12 months? No       Health Maintenance reviewed and discussed and ordered per Provider. Health Maintenance Due   Topic Date Due    FOOT EXAM Q1  10/24/1945    EYE EXAM RETINAL OR DILATED  10/24/1946    DTaP/Tdap/Td series (1 - Tdap) 10/24/1947    Shingrix Vaccine Age 50> (1 of 2) 10/24/1986    GLAUCOMA SCREENING Q2Y  10/24/2001    Bone Densitometry (Dexa) Screening  10/24/2001    Pneumococcal 65+ years (1 of 1 - PPSV23) 10/24/2001    Influenza Age 5 to Adult  08/01/2019           Coordination of Care:  1. Have you been to the ER, urgent care clinic since your last visit? Hospitalized since your last visit? no    2. Have you seen or consulted any other health care providers outside of the [address] [place] [address] since your last visit? Include any pap smears or colon screening.  no

## 2020-01-14 NOTE — PROGRESS NOTES
Kel Aguirre is a 81 y.o. female presenting today for Diabetes (follow-up)    HPI:  Kel Aguirre presents to the office today for diabetes, hyperlipidemia, depression and overactive bladder follow-up care. Patient presents today for follow-up. She does have a mild intermittent left foot, third digit toe pain. She notes the pain is generally noticed when she has worn her shoes for all day. She denies any pain today. Patient also has a history of diabetes mellitus. She denies any polyuria, polydipsia or polyphagia. She takes metformin daily and is compliant with taking her medication. Her previous hemoglobin A1c was 7.1. Patient also has a history of overactive bladder. She when she was in the practice during her last office visit she was given Myrbetriq. Patient has not started Myrbetriq secondary to hearing the side effects. She continues to get up several times a night. She notes that she is willing to try other medication at this office visit. Review of Systems   Constitutional: Negative for malaise/fatigue. Respiratory: Negative for cough and sputum production. Cardiovascular: Negative for chest pain, palpitations and leg swelling. Gastrointestinal: Negative for abdominal pain, nausea and vomiting. Genitourinary: Positive for frequency. Neurological: Negative for dizziness. No Known Allergies    Current Outpatient Medications   Medication Sig Dispense Refill    mirabegron ER (MYRBETRIQ) 25 mg ER tablet Take 1 Tab by mouth daily. 30 Tab 3    metFORMIN (GLUCOPHAGE) 850 mg tablet Take 1 Tab by mouth two (2) times a day. 60 Tab 1    flash glucose scanning reader (FREESTYLE IZZY 14 DAY READER) misc 1 Each by Does Not Apply route two (2) times a day. 30 Each 2    flash glucose sensor (FREESTYLE IZZY 14 DAY SENSOR) kit 1 Each by Does Not Apply route two (2) times a day. 1 Kit 1    glucose blood VI test strips (ASCENSIA AUTODISC VI, ONE TOUCH ULTRA TEST VI) strip 1 Each.       Blood-Glucose Meter misc by Misc. (Non-Drug; Combo Route) route.  cyanocobalamin 1,000 mcg tablet 1,000 mcg.  Glucosamine HCl 1,500 mg tab 1,500 mg.      lancets misc 1 Each.  meclizine (ANTIVERT) 25 mg tablet 25 mg.      omega 3-dha-epa-fish oil (FISH OIL) 60- mg cap 500 mg.  pravastatin (PRAVACHOL) 10 mg tablet TAKE 1 TABLET BY MOUTH EVERY EVENING      peg 400-propylene glycol (SYSTANE) 0.4-0.3 % drop Instill  in eye.  vitamins a & d cap Take  by Mouth Once a Day.       sertraline (ZOLOFT) 50 mg tablet TAKE 1 TABLET BY MOUTH EVERY DAY         Past Medical History:   Diagnosis Date    Depression     Diabetes ([facility] [address]     Hypercholesterolemia        Past Surgical History:   Procedure Laterality Date    HX BREAST LUMPECTOMY Bilateral     HX HYSTERECTOMY         Social History     Socioeconomic History    Marital status:      Spouse name: Not on file    Number of children: Not on file    Years of education: Not on file    Highest education level: Not on file   Occupational History    Not on file   Social Needs    Financial resource strain: Not on file    Food insecurity:     Worry: Not on file     Inability: Not on file    Transportation needs:     Medical: Not on file     Non-medical: Not on file   Tobacco Use    Smoking status: Never Smoker    Smokeless tobacco: Never Used   Substance and Sexual Activity    Alcohol use: No     Frequency: Never    Drug use: No    Sexual activity: Not Currently   Lifestyle    Physical activity:     Days per week: Not on file     Minutes per session: Not on file    Stress: Not on file   Relationships    Social connections:     Talks on phone: Not on file     Gets together: Not on file     Attends Mosque service: Not on file     Active member of club or organization: Not on file     Attends meetings of clubs or organizations: Not on file     Relationship status: Not on file    Intimate partner violence:     Fear of current or ex partner: Not on file     Emotionally abused: Not on file     Physically abused: Not on file     Forced sexual activity: Not on file   Other Topics Concern    Not on file   Social History Narrative    Not on file       Patient does not have an advanced directive on file    Vitals:    01/14/20 1324   BP: 121/59   Pulse: 83   Resp: 12   Temp: 96.4 °F (35.8 °C)   TempSrc: Oral   SpO2: 97%   Weight: 151 lb (68.5 kg)   Height: 5' 2\" (1.575 m)   PainSc:   0 - No pain       Physical Exam  Vitals signs and nursing note reviewed. Constitutional:       Appearance: Normal appearance. Cardiovascular:      Rate and Rhythm: Normal rate and regular rhythm. Pulses: Normal pulses. Heart sounds: Normal heart sounds. Pulmonary:      Effort: Pulmonary effort is normal.      Breath sounds: Normal breath sounds. Skin:     General: Skin is warm and dry. Neurological:      Mental Status: She is alert. Office Visit on 01/14/2020   Component Date Value Ref Range Status    Hemoglobin A1c (POC) 01/14/2020 6.1  % Final       .  Results for orders placed or performed in visit on 01/14/20   AMB POC HEMOGLOBIN A1C   Result Value Ref Range    Hemoglobin A1c (POC) 6.1 %       Assessment / Plan:      ICD-10-CM ICD-9-CM    1. Controlled type 2 diabetes mellitus with complication, without long-term current use of insulin (HCC) E11.8 250.90 AMB POC HEMOGLOBIN A1C      LIPID PANEL      METABOLIC PANEL, COMPREHENSIVE      HEMOGLOBIN A1C WITH EAG   2. Breast cancer screening Z12.39 V76.10 RAIN MAMMO BI SCREENING INCL CAD   3. Encounter for screening mammogram for malignant neoplasm of breast  Z12.31 V76.12 RAIN MAMMO BI SCREENING INCL CAD   4. Mixed hyperlipidemia E78.2 272.2 CBC WITH AUTOMATED DIFF      LIPID PANEL     Hemoglobin A1c 6.1  Fasting labs ordered  Mammogram ordered  Follow-up in 4 months      Follow-up and Dispositions    · Return in about 4 months (around 5/14/2020).          I asked the patient if she  had any questions and answered her  questions. The patient stated that she understands the treatment plan and agrees with the treatment plan    This document was created with a voice activated dictation system and may contain transcription errors.

## 2020-08-04 ENCOUNTER — HOSPITAL ENCOUNTER (EMERGENCY)
Age: 85
Discharge: LEFT BEFORE DISCHARGE | End: 2020-08-04
Admitting: EMERGENCY MEDICINE
Payer: MEDICARE

## 2020-08-04 VITALS
TEMPERATURE: 98.5 F | DIASTOLIC BLOOD PRESSURE: 78 MMHG | OXYGEN SATURATION: 97 % | HEART RATE: 89 BPM | SYSTOLIC BLOOD PRESSURE: 153 MMHG | RESPIRATION RATE: 16 BRPM

## 2020-08-04 PROCEDURE — 75810000275 HC EMERGENCY DEPT VISIT NO LEVEL OF CARE

## 2020-08-04 NOTE — ED TRIAGE NOTES
Pt to triage with complaints of shoulder, arm and neck pain on the left side. Pt is having trouble lifting that arm now.  PT states its been hurting for a few days now

## 2020-08-28 ENCOUNTER — OFFICE VISIT (OUTPATIENT)
Dept: ORTHOPEDIC SURGERY | Facility: CLINIC | Age: 85
End: 2020-08-28

## 2020-08-28 VITALS
DIASTOLIC BLOOD PRESSURE: 76 MMHG | OXYGEN SATURATION: 98 % | WEIGHT: 152 LBS | SYSTOLIC BLOOD PRESSURE: 130 MMHG | HEIGHT: 62 IN | BODY MASS INDEX: 27.97 KG/M2 | TEMPERATURE: 97.5 F | RESPIRATION RATE: 18 BRPM | HEART RATE: 96 BPM

## 2020-08-28 DIAGNOSIS — M25.512 CHRONIC LEFT SHOULDER PAIN: ICD-10-CM

## 2020-08-28 DIAGNOSIS — M75.52 CHRONIC BURSITIS OF LEFT SHOULDER: Primary | ICD-10-CM

## 2020-08-28 DIAGNOSIS — G89.29 CHRONIC LEFT SHOULDER PAIN: ICD-10-CM

## 2020-08-28 RX ORDER — TRAMADOL HYDROCHLORIDE 50 MG/1
50 TABLET ORAL
COMMUNITY
End: 2020-12-10

## 2020-08-28 RX ORDER — BETAMETHASONE SODIUM PHOSPHATE AND BETAMETHASONE ACETATE 3; 3 MG/ML; MG/ML
6 INJECTION, SUSPENSION INTRA-ARTICULAR; INTRALESIONAL; INTRAMUSCULAR; SOFT TISSUE ONCE
Qty: 0.5 ML | Refills: 0
Start: 2020-08-28 | End: 2020-08-28

## 2020-08-28 NOTE — PROGRESS NOTES
Patient: Shakeel Cano                MRN: [id number]       SSN: [id number]  YOB: 1935        AGE: 81 y.o. SEX: female    PCP: Dario Rincon NP  08/28/20    Chief Complaint   Patient presents with    Shoulder Pain     Left     HISTORY:  Shakeel Cano is a 81 y.o. female who is seen for left shoulder pain and stiffness. She has been experiencing increased left shoulder and arm pain for the past several weeks. She does not recall any injury. She feels shoulder pain with overhead activities and at night. She was previously responded to injections by Dr. Enio Botello for carpal tunnel syndrome. Pain Assessment  8/28/2020   Location of Pain Shoulder   Location Modifiers Left   Severity of Pain 8   Quality of Pain Aching   Quality of Pain Comment -   Duration of Pain A few minutes   Frequency of Pain Intermittent   Aggravating Factors Other (Comment)   Aggravating Factors Comment Raising arm up   Limiting Behavior -   Relieving Factors Rest   Result of Injury -     Occupation, etc:  Ms. Laurie Dietz previously worked on the [employer] at [employer]. She is now retired. She lives alone in [place]. She has many stepchildren living in [place]. She wants to get a dog. 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 Ms. Laurie Dietz weighs 152 lbs and is 5'2\" tall. Lab Results   Component Value Date/Time    Hemoglobin A1c 7.1 (H) 09/16/2019 10:06 AM    Hemoglobin A1c (POC) 6.1 01/14/2020 01:25 PM     Weight Metrics 8/28/2020 1/14/2020 9/17/2019 8/13/2019 2/19/2019   Weight 152 lb 151 lb 159 lb 159 lb 9.6 oz 163 lb   BMI 27.8 kg/m2 27.62 kg/m2 29.08 kg/m2 29.19 kg/m2 29.81 kg/m2       There is no problem list on file for this patient.     REVIEW OF SYSTEMS:    Constitutional Symptoms: Negative   Eyes: Negative   Ears, Nose, Throat and Mouth: Negative   Cardiovascular: Negative   Respiratory: Negative   Genitourinary: Per HPI   Gastrointestinal: Per HPI   Integumentary (Skin and/or Breast): Negative   Musculoskeletal: Per HPI   Endocrine/Rheumatologic: Negative   Neurological: Per HPI   Hematology/Lymphatic: Negative    Allergic/Immunologic: Negative   Phychiatric: Negative    Social History     Socioeconomic History    Marital status:      Spouse name: Not on file    Number of children: Not on file    Years of education: Not on file    Highest education level: Not on file   Occupational History    Not on file   Social Needs    Financial resource strain: Not on file    Food insecurity     Worry: Not on file     Inability: Not on file    Transportation needs     Medical: Not on file     Non-medical: Not on file   Tobacco Use    Smoking status: Never Smoker    Smokeless tobacco: Never Used   Substance and Sexual Activity    Alcohol use: No     Frequency: Never    Drug use: No    Sexual activity: Not Currently   Lifestyle    Physical activity     Days per week: Not on file     Minutes per session: Not on file    Stress: Not on file   Relationships    Social connections     Talks on phone: Not on file     Gets together: Not on file     Attends Anabaptist service: Not on file     Active member of club or organization: Not on file     Attends meetings of clubs or organizations: Not on file     Relationship status: Not on file    Intimate partner violence     Fear of current or ex partner: Not on file     Emotionally abused: Not on file     Physically abused: Not on file     Forced sexual activity: Not on file   Other Topics Concern    Not on file   Social History Narrative    Not on file      No Known Allergies   Current Outpatient Medications   Medication Sig    traMADoL (ULTRAM) 50 mg tablet Take 50 mg by mouth every six (6) hours as needed for Pain.  mirabegron ER (MYRBETRIQ) 25 mg ER tablet Take 1 Tab by mouth daily.  metFORMIN (GLUCOPHAGE) 850 mg tablet Take 1 Tab by mouth two (2) times a day.     flash glucose scanning reader ("[employer] IZZY 14 DAY READER) misc 1 Each by Does Not Apply route two (2) times a day.  flash glucose sensor (FREESTYLE IZZY 14 DAY SENSOR) kit 1 Each by Does Not Apply route two (2) times a day.  glucose blood VI test strips (ASCENSIA AUTODISC VI, ONE TOUCH ULTRA TEST VI) strip 1 Each.  Blood-Glucose Meter misc by Misc. (Non-Drug; Combo Route) route.  cyanocobalamin 1,000 mcg tablet 1,000 mcg.  Glucosamine HCl 1,500 mg tab 1,500 mg.    lancets misc 1 Each.  meclizine (ANTIVERT) 25 mg tablet 25 mg.    omega 3-dha-epa-fish oil (FISH OIL) 60- mg cap 500 mg.  pravastatin (PRAVACHOL) 10 mg tablet TAKE 1 TABLET BY MOUTH EVERY EVENING    peg 400-propylene glycol (SYSTANE) 0.4-0.3 % drop Instill  in eye.  sertraline (ZOLOFT) 50 mg tablet TAKE 1 TABLET BY MOUTH EVERY DAY    vitamins a & d cap Take  by Mouth Once a Day. No current facility-administered medications for this visit. PHYSICAL EXAMINATION:  Visit Vitals  /76 (BP 1 Location: Left arm, BP Patient Position: Sitting)   Pulse 96   Temp 97.5 °F (36.4 °C) (Oral)   Resp 18   Ht 5' 2\" (1.575 m)   Wt 152 lb (68.9 kg)   LMP  (LMP Unknown)   SpO2 98% Comment: RA   BMI 27.80 kg/m²    Appearance: Alert, well appearing and pleasant patient who is in no distress, oriented to person, place/time, and who follows commands. HEENT: [place] Lefort hears well, does not require hearing aids. Her sclera of the eyes are non-icteric. She is breathing normally and no respiratory accessory muscle use is noted. No JVD present and Neck ROM within normal limits. Psychiatric: Affect and mood are appropriate. Oriented x3  Cardiovascular/Peripheral Vascular: Normal pulses to each foot. Integumentary: No rashes. Warm and normal color. No drainage.    Gait: normal  Sensory Exam: Intact/Normal Sensation    Lymphatic: No evidence of Lymphedema  Vascular:       Pulses: palpable  Varicosities none  Wounds/Abrasion: None Present  Neuro: Negative, no tremors  ORTHO EXAMINATION:  Examination Right shoulder Left shoulder   Skin Intact Intact   Effusion - -   Biceps deformity - -   Atrophy - -   AC joint tenderness - -   Acromial tenderness - +   Biceps tenderness - -   Forward flexion/Elevation  170   Active abduction  170   External rotation ROM 30 35   Internal rotation ROM 90 70   Apprehension - -   Impingement - -   Drop Arm Test - -   Neurovascular Intact Intact      TIME OUT:  Chart reviewed for the following:   Alita Cheadle, MD, have reviewed the History, Physical and updated the Allergic reactions for [facility] performed immediately prior to start of procedure:  Alita Cheadle, MD, have performed the following reviews on Joaquina Danielle prior to the start of the procedure:          * Patient was identified by name and date of birth   * Agreement on procedure being performed was verified  * Risks and Benefits explained to the patient  * Procedure site verified and marked as necessary  * Patient was positioned for comfort  * Consent was obtained     Time: 3:05 PM     Date of procedure: 8/28/2020  Procedure performed by:  Lucrecia Bae MD  Ms. Bruno Cho tolerated the procedure well with no complications. RADIOGRAPHS:  XR LEFT SHOULDER 8/10/20 [facility]  IMPRESSION  Negative for fracture or dislocation in the left shoulder. IMPRESSION:      ICD-10-CM ICD-9-CM    1. Chronic bursitis of left shoulder  M75.52 726.10 betamethasone (Celestone Soluspan) 6 mg/mL injection      BETAMETHASONE ACETATE & SODIUM PHOSPHATE INJECTION 3 MG EA.      DRAIN/INJECT LARGE JOINT/BURSA   2. Chronic left shoulder pain  M25.512 719.41 betamethasone (Celestone Soluspan) 6 mg/mL injection    G89.29 338.29 BETAMETHASONE ACETATE & SODIUM PHOSPHATE INJECTION 3 MG EA.      DRAIN/INJECT LARGE JOINT/BURSA     PLAN:  After discussing treatment options, patient's left shoulder was injected with 4 cc Marcaine and 1/2 cc Celestone. There is no need for surgery at this time. She will follow up as needed.       Scribed by Maudry Cooks Talib ([address]) as dictated by Roldan President, MD

## 2020-08-28 NOTE — PROGRESS NOTES
1. Have you been to the ER, urgent care clinic since your last visit? Hospitalized since your last visit? Yes When: 8/2020 Where: [facility] Reason for visit: Left shoulder Pain    2. Have you seen or consulted any other health care providers outside of the [address] [place] [address] since your last visit? Include any pap smears or colon screening.  No

## 2020-09-15 NOTE — TELEPHONE ENCOUNTER
Requested Prescriptions     Pending Prescriptions Disp Refills    metFORMIN (GLUCOPHAGE) 850 mg tablet 60 Tab 1     Sig: Take 1 Tab by mouth two (2) times a day.     sertraline (ZOLOFT) 50 mg tablet

## 2020-09-17 RX ORDER — SERTRALINE HYDROCHLORIDE 50 MG/1
TABLET, FILM COATED ORAL
Qty: 90 TAB | Refills: 0 | Status: SHIPPED | OUTPATIENT
Start: 2020-09-17

## 2020-09-17 RX ORDER — METFORMIN HYDROCHLORIDE 850 MG/1
850 TABLET ORAL 2 TIMES DAILY
Qty: 60 TAB | Refills: 1 | Status: SHIPPED | OUTPATIENT
Start: 2020-09-17 | End: 2020-10-19

## 2020-10-06 ENCOUNTER — VIRTUAL VISIT (OUTPATIENT)
Dept: FAMILY MEDICINE CLINIC | Age: 85
End: 2020-10-06
Payer: MEDICARE

## 2020-10-06 DIAGNOSIS — E55.9 VITAMIN D DEFICIENCY: ICD-10-CM

## 2020-10-06 DIAGNOSIS — M25.512 CHRONIC LEFT SHOULDER PAIN: Primary | ICD-10-CM

## 2020-10-06 DIAGNOSIS — G47.00 INSOMNIA, UNSPECIFIED TYPE: ICD-10-CM

## 2020-10-06 DIAGNOSIS — E11.8 CONTROLLED TYPE 2 DIABETES MELLITUS WITH COMPLICATION, WITHOUT LONG-TERM CURRENT USE OF INSULIN (HCC): ICD-10-CM

## 2020-10-06 DIAGNOSIS — G89.29 CHRONIC LEFT SHOULDER PAIN: Primary | ICD-10-CM

## 2020-10-06 DIAGNOSIS — E78.2 MIXED HYPERLIPIDEMIA: ICD-10-CM

## 2020-10-06 DIAGNOSIS — Z00.00 MEDICARE ANNUAL WELLNESS VISIT, SUBSEQUENT: ICD-10-CM

## 2020-10-06 PROCEDURE — G8428 CUR MEDS NOT DOCUMENT: HCPCS | Performed by: NURSE PRACTITIONER

## 2020-10-06 PROCEDURE — G8419 CALC BMI OUT NRM PARAM NOF/U: HCPCS | Performed by: NURSE PRACTITIONER

## 2020-10-06 PROCEDURE — G8536 NO DOC ELDER MAL SCRN: HCPCS | Performed by: NURSE PRACTITIONER

## 2020-10-06 PROCEDURE — 99213 OFFICE O/P EST LOW 20 MIN: CPT | Performed by: NURSE PRACTITIONER

## 2020-10-06 PROCEDURE — G8432 DEP SCR NOT DOC, RNG: HCPCS | Performed by: NURSE PRACTITIONER

## 2020-10-06 PROCEDURE — 1101F PT FALLS ASSESS-DOCD LE1/YR: CPT | Performed by: NURSE PRACTITIONER

## 2020-10-06 PROCEDURE — G0439 PPPS, SUBSEQ VISIT: HCPCS | Performed by: NURSE PRACTITIONER

## 2020-10-06 PROCEDURE — G8400 PT W/DXA NO RESULTS DOC: HCPCS | Performed by: NURSE PRACTITIONER

## 2020-10-06 RX ORDER — PRAVASTATIN SODIUM 10 MG/1
TABLET ORAL
Qty: 90 TAB | Refills: 1 | Status: SHIPPED | OUTPATIENT
Start: 2020-10-06

## 2020-10-06 RX ORDER — MIRTAZAPINE 7.5 MG/1
7.5 TABLET, FILM COATED ORAL
Qty: 30 TAB | Refills: 1 | Status: SHIPPED | OUTPATIENT
Start: 2020-10-06

## 2020-10-06 NOTE — PROGRESS NOTES
Eric Garza presents today for   Chief Complaint   Patient presents with   [facility] Visit       Virtual/telephone visit    Depression Screening:  3 most recent PHQ Screens 10/6/2020   Little interest or pleasure in doing things Not at all   Feeling down, depressed, irritable, or hopeless Not at all   Total Score PHQ 2 0       Learning Assessment:  No flowsheet data found. Abuse Screening:  No flowsheet data found. Fall Risk  Fall Risk Assessment, last 12 mths 10/6/2020   Able to walk? Yes   Fall in past 12 months? No         Health Maintenance reviewed and discussed and ordered per Provider. Health Maintenance Due   Topic Date Due    Foot Exam Q1  10/24/1945    Eye Exam Retinal or Dilated  10/24/1946    DTaP/Tdap/Td series (1 - Tdap) 10/24/1957    Shingrix Vaccine Age 50> (1 of 2) 10/24/1986    Bone Densitometry (Dexa) Screening  10/24/2001    Pneumococcal 65+ years (1 of 1 - PPSV23) 10/24/2001    Flu Vaccine (1) 09/01/2020    MICROALBUMIN Q1  09/16/2020    Lipid Screen  09/16/2020    Medicare Yearly Exam  09/17/2020   . Coordination of Care:  1. Have you been to the ER, urgent care clinic since your last visit? Hospitalized since your last visit? no    2. Have you seen or consulted any other health care providers outside of the [address] [place] [address] since your last visit? Include any pap smears or colon screening.  no

## 2020-10-06 NOTE — PROGRESS NOTES
This is the Subsequent Medicare Annual Wellness Exam, performed 12 months or more after the Initial AWV or the last Subsequent AWV    I have reviewed the patient's medical history in detail and updated the computerized patient record. History   There is no problem list on file for this patient. Past Medical History:   Diagnosis Date    Depression     Diabetes ([address]     Hypercholesterolemia       Past Surgical History:   Procedure Laterality Date    HX BREAST LUMPECTOMY Bilateral     HX HYSTERECTOMY       Current Outpatient Medications   Medication Sig Dispense Refill    pravastatin (PRAVACHOL) 10 mg tablet TAKE 1 TABLET BY MOUTH EVERY EVENING 90 Tab 1    mirtazapine (REMERON) 7.5 mg tablet Take 1 Tab by mouth nightly. 30 Tab 1    metFORMIN (GLUCOPHAGE) 850 mg tablet Take 1 Tab by mouth two (2) times a day. 60 Tab 1    sertraline (ZOLOFT) 50 mg tablet TAKE 1 TABLET BY MOUTH EVERY DAY 90 Tab 0    mirabegron ER (MYRBETRIQ) 25 mg ER tablet Take 1 Tab by mouth daily. 30 Tab 3    flash glucose scanning reader (FREESTYLE IZZY 14 DAY READER) misc 1 Each by Does Not Apply route two (2) times a day. 30 Each 2    flash glucose sensor (FREESTYLE IZZY 14 DAY SENSOR) kit 1 Each by Does Not Apply route two (2) times a day. 1 Kit 1    glucose blood VI test strips (ASCENSIA AUTODISC VI, ONE TOUCH ULTRA TEST VI) strip 1 Each.  Blood-Glucose Meter misc by Misc. (Non-Drug; Combo Route) route.  cyanocobalamin 1,000 mcg tablet 1,000 mcg.  Glucosamine HCl 1,500 mg tab 1,500 mg.      lancets misc 1 Each.  meclizine (ANTIVERT) 25 mg tablet 25 mg.      omega 3-dha-epa-fish oil (FISH OIL) 60- mg cap 500 mg.  peg 400-propylene glycol (SYSTANE) 0.4-0.3 % drop Instill  in eye.  vitamins a & d cap Take  by Mouth Once a Day.  traMADoL (ULTRAM) 50 mg tablet Take 50 mg by mouth every six (6) hours as needed for Pain. No Known Allergies    History reviewed.  No pertinent family history. Social History     Tobacco Use    Smoking status: Never Smoker    Smokeless tobacco: Never Used   Substance Use Topics    Alcohol use: No     Frequency: Never       Depression Risk Factor Screening:     3 most recent PHQ Screens 10/6/2020   Little interest or pleasure in doing things Not at all   Feeling down, depressed, irritable, or hopeless Not at all   Total Score PHQ 2 0       Alcohol Risk Screen   Do you average more than 1 drink per night or more than 7 drinks a week:  No    On any one occasion in the past three months have you have had more than 3 drinks containing alcohol:  No        Functional Ability and Level of Safety:   Hearing: Hearing is good. Activities of Daily Living: The home contains: no safety equipment. Patient does total self care     Ambulation: with no difficulty     Fall Risk:  Fall Risk Assessment, last 12 mths 10/6/2020   Able to walk? Yes   Fall in past 12 months? No     Abuse Screen:  Patient is not abused       Cognitive Screening   Has your family/caregiver stated any concerns about your memory: yes    Cognitive Screening: Normal - normal recall, Abnormal - normal recall    Patient Care Team   Patient Care Team:  Emanuel Jackson NP as PCP - General (Nurse Practitioner)  Emanuel Jackson NP as PCP [facility]    Assessment/Plan   Education and counseling provided:  Are appropriate based on today's review and evaluation    Diagnoses and all orders for this visit:    1. Chronic left shoulder pain  -     REFERRAL TO ORTHOPEDIC SURGERY    2. Mixed hyperlipidemia  -     pravastatin (PRAVACHOL) 10 mg tablet; TAKE 1 TABLET BY MOUTH EVERY EVENING  -     LIPID PANEL; Future    3. Controlled type 2 diabetes mellitus with complication, without long-term current use of insulin (HCC)  -     METABOLIC PANEL, COMPREHENSIVE; Future  -     HEMOGLOBIN A1C WITH EAG; Future  -     CBC WITH AUTOMATED DIFF;  Future  -     MICROALBUMIN, UR, RAND W/ MICROALB/CREAT RATIO; Future    4. Medicare annual wellness visit, subsequent    5. Vitamin D deficiency    6. Insomnia, unspecified type  -     mirtazapine (REMERON) 7.5 mg tablet; Take 1 Tab by mouth nightly. Health Maintenance Due   Topic Date Due    Foot Exam Q1  10/24/1945    Eye Exam Retinal or Dilated  10/24/1945    DTaP/Tdap/Td series (1 - Tdap) 10/24/1957    Shingrix Vaccine Age 50> (1 of 2) 10/24/1986    Bone Densitometry (Dexa) Screening  10/24/2001    Pneumococcal 65+ years (1 of 1 - PPSV23) 10/24/2001    Flu Vaccine (1) 09/01/2020    MICROALBUMIN Q1  09/16/2020    Lipid Screen  09/16/2020    Medicare Yearly Exam  09/17/2020       Desi Siddiqi, who was evaluated through a synchronous (real-time) audio only encounter, and/or her healthcare decision maker, is aware that it is a billable service, with coverage as determined by her insurance carrier. She provided verbal consent to proceed: n/a- consent obtained within past 12 months, and patient identification was verified. It was conducted pursuant to the emergency declaration under the [facility], [address] [place] [address] authority and the [employer] General Act. A caregiver was present when appropriate. Ability to conduct physical exam was limited. I was in the office. The patient was at home.     Mckayla Cadena NP

## 2020-10-06 NOTE — PROGRESS NOTES
Amaury Rowell is a 81 y.o. female who was seen by synchronous (real-time) audio-video technology on 10/6/2020 for Annual Wellness Visit    Assessment & Plan:   Diagnoses and all orders for this visit:    1. Chronic left shoulder pain  -     REFERRAL TO ORTHOPEDIC SURGERY    2. Mixed hyperlipidemia  -     pravastatin (PRAVACHOL) 10 mg tablet; TAKE 1 TABLET BY MOUTH EVERY EVENING  -     LIPID PANEL; Future    3. Controlled type 2 diabetes mellitus with complication, without long-term current use of insulin ([facility])  -     METABOLIC PANEL, COMPREHENSIVE; Future  -     HEMOGLOBIN A1C WITH EAG; Future  -     CBC WITH AUTOMATED DIFF; Future  -     MICROALBUMIN, UR, RAND W/ MICROALB/CREAT RATIO; Future    4. Medicare annual wellness visit, subsequent    5. Vitamin D deficiency    6. Insomnia, unspecified type  -     mirtazapine (REMERON) 7.5 mg tablet; Take 1 Tab by mouth nightly. Subjective: The patient presents for an Audio-visual teleconference appointment for annual wellness and diabetes follow-up care. The patient is complaining of left shoulder pain. He was previously seen by orthopedic services in May, 2020 but notes the pain has not improved. Per the patient she believes her shoulder is progressively worsening. She would like to be referred to a another orthopedic provider. Diabetes mellitus-previous hemoglobin A1c was January, 2020. The hemoglobin A1c was 6.1. Patient had fasting labs ordered on January 14, 2020 which she is not completed. Hyperlipidemia-patient is requesting pravastatin refill. Her lipid panel was in September, 2019 and she had elevated triglycerides and HDL of 38. She is complaining of insomnia. She has purchased multiple over-the-counter medication with nothing been effective. Prior to Admission medications    Medication Sig Start Date End Date Taking?  Authorizing Provider   pravastatin (PRAVACHOL) 10 mg tablet TAKE 1 TABLET BY MOUTH EVERY EVENING 10/6/20  Yes Bo Lot, NP   mirtazapine (REMERON) 7.5 mg tablet Take 1 Tab by mouth nightly. 10/6/20  Yes Bo Vega, NP   metFORMIN (GLUCOPHAGE) 850 mg tablet Take 1 Tab by mouth two (2) times a day. 9/17/20  Yes Bo Lot, NP   sertraline (ZOLOFT) 50 mg tablet TAKE 1 TABLET BY MOUTH EVERY DAY 9/17/20  Yes Bo Vega, NP   mirabegron ER (MYRBETRIQ) 25 mg ER tablet Take 1 Tab by mouth daily. 9/17/19  Yes Bo Lot, NP   flash glucose scanning reader (FREESTYLE IZZY 14 DAY READER) misc 1 Each by Does Not Apply route two (2) times a day. 8/13/19  Yes Bo Vega, NP   flash glucose sensor (FREESTYLE IZZY 14 DAY SENSOR) kit 1 Each by Does Not Apply route two (2) times a day. 8/13/19  Yes Bo Vega, NP   glucose blood VI test strips (ASCENSIA AUTODISC VI, ONE TOUCH ULTRA TEST VI) strip 1 Each. 10/27/14  Yes Provider, Historical   Blood-Glucose Meter misc by Misc. (Non-Drug; Combo Route) route. Yes Provider, Historical   cyanocobalamin 1,000 mcg tablet 1,000 mcg. Yes Provider, Historical   Glucosamine HCl 1,500 mg tab 1,500 mg. Yes Provider, Historical   lancets misc 1 Each. 10/27/14  Yes Provider, Historical   meclizine (ANTIVERT) 25 mg tablet 25 mg. Yes Provider, Historical   omega 3-dha-epa-fish oil (FISH OIL) 60- mg cap 500 mg. Yes Provider, Historical   peg 400-propylene glycol (SYSTANE) 0.4-0.3 % drop Instill  in eye. 10/27/14  Yes Provider, Historical   vitamins a & d cap Take  by Mouth Once a Day. Yes Provider, Historical   traMADoL (ULTRAM) 50 mg tablet Take 50 mg by mouth every six (6) hours as needed for Pain. Provider, Historical   pravastatin (PRAVACHOL) 10 mg tablet TAKE 1 TABLET BY MOUTH EVERY EVENING 12/3/18 10/6/20  Provider, Historical     There is no problem list on file for this patient. There are no active problems to display for this patient.     Current Outpatient Medications   Medication Sig Dispense Refill  pravastatin (PRAVACHOL) 10 mg tablet TAKE 1 TABLET BY MOUTH EVERY EVENING 90 Tab 1    mirtazapine (REMERON) 7.5 mg tablet Take 1 Tab by mouth nightly. 30 Tab 1    metFORMIN (GLUCOPHAGE) 850 mg tablet Take 1 Tab by mouth two (2) times a day. 60 Tab 1    sertraline (ZOLOFT) 50 mg tablet TAKE 1 TABLET BY MOUTH EVERY DAY 90 Tab 0    mirabegron ER (MYRBETRIQ) 25 mg ER tablet Take 1 Tab by mouth daily. 30 Tab 3    flash glucose scanning reader (FREESTYLE IZZY 14 DAY READER) misc 1 Each by Does Not Apply route two (2) times a day. 30 Each 2    flash glucose sensor (FREESTYLE IZZY 14 DAY SENSOR) kit 1 Each by Does Not Apply route two (2) times a day. 1 Kit 1    glucose blood VI test strips (ASCENSIA AUTODISC VI, ONE TOUCH ULTRA TEST VI) strip 1 Each.  Blood-Glucose Meter misc by Misc. (Non-Drug; Combo Route) route.  cyanocobalamin 1,000 mcg tablet 1,000 mcg.  Glucosamine HCl 1,500 mg tab 1,500 mg.      lancets misc 1 Each.  meclizine (ANTIVERT) 25 mg tablet 25 mg.      omega 3-dha-epa-fish oil (FISH OIL) 60- mg cap 500 mg.  peg 400-propylene glycol (SYSTANE) 0.4-0.3 % drop Instill  in eye.  vitamins a & d cap Take  by Mouth Once a Day.  traMADoL (ULTRAM) 50 mg tablet Take 50 mg by mouth every six (6) hours as needed for Pain.        No Known Allergies  Past Medical History:   Diagnosis Date    Depression     [facility]     Hypercholesterolemia      Past Surgical History:   Procedure Laterality Date    HX BREAST LUMPECTOMY Bilateral     HX HYSTERECTOMY         ROS    Constitutional: No apparent distress noted  General- negative for fever, chills or fatigue  Eyes- negative visual changes  CV- denies chest pain, palpitation  Pul: negative cough or SOB  GI: negative nausea, flank pain, diarrhea, constipation  Urinary:- No dysuria or polyuria  MS- chronic left shoulder pain  Neuro- negative headache, dizziness or weakness  Skin- negative for rashes or lesions. Psych- insomnia    Objective:   No flowsheet data found. [INSTRUCTIONS:  \"[x]\" Indicates a positive item  \"[]\" Indicates a negative item  -- DELETE ALL ITEMS NOT EXAMINED]    Constitutional: [x] Appears well-developed and well-nourished [x] No apparent distress      [] Abnormal -     Mental status: [x] Alert and awake  [x] Oriented to person/place/time [x] Able to follow commands    [] Abnormal -     Eyes:   EOM    [x]  Normal    [] Abnormal -   Sclera  [x]  Normal    [] Abnormal -          Discharge [x]  None visible   [] Abnormal -     HENT: [x] Normocephalic, atraumatic  [] Abnormal -   [x] Mouth/Throat: Mucous membranes are moist    External Ears [x] Normal  [] Abnormal -    Neck: [x] No visualized mass [] Abnormal -     Pulmonary/Chest: [x] Respiratory effort normal   [x] No visualized signs of difficulty breathing or respiratory distress        [] Abnormal -      Musculoskeletal:   [x] Normal gait with no signs of ataxia         [x] Normal range of motion of neck        [] Abnormal -     Neurological:        [x] No Facial Asymmetry (Cranial nerve 7 motor function) (limited exam due to video visit)          [x] No gaze palsy        [] Abnormal -          Skin:        [x] No significant exanthematous lesions or discoloration noted on facial skin         [] Abnormal -            Psychiatric:       [x] Normal Affect [] Abnormal -        [x] No Hallucinations    Other pertinent observable physical exam findings:-        We discussed the expected course, resolution and complications of the diagnosis(es) in detail. Medication risks, benefits, costs, interactions, and alternatives were discussed as indicated. I advised her to contact the office if her condition worsens, changes or fails to improve as anticipated. She expressed understanding with the diagnosis(es) and plan.        Parminder Goff, who was evaluated through a patient-initiated, synchronous (real-time) audio-video encounter, and/or her healthcare decision maker, is aware that it is a billable service, with coverage as determined by her insurance carrier. She provided verbal consent to proceed: Yes, and patient identification was verified. It was conducted pursuant to the emergency declaration under the [address] [place] [address], [address] [place] [address] authority and the [employer] and [employer] General Act. A caregiver was present when appropriate. Ability to conduct physical exam was limited. I was at home. The patient was at home.       Krystian Soriano NP

## 2020-10-19 RX ORDER — METFORMIN HYDROCHLORIDE 850 MG/1
TABLET ORAL
Qty: 180 TAB | Refills: 0 | Status: SHIPPED | OUTPATIENT
Start: 2020-10-19 | End: 2020-12-10 | Stop reason: SDUPTHER

## 2020-12-10 ENCOUNTER — VIRTUAL VISIT (OUTPATIENT)
Dept: FAMILY MEDICINE CLINIC | Age: 85
End: 2020-12-10
Payer: MEDICARE

## 2020-12-10 DIAGNOSIS — E78.2 MIXED HYPERLIPIDEMIA: ICD-10-CM

## 2020-12-10 DIAGNOSIS — F41.0 PANIC ATTACK: ICD-10-CM

## 2020-12-10 DIAGNOSIS — E11.8 CONTROLLED TYPE 2 DIABETES MELLITUS WITH COMPLICATION, WITHOUT LONG-TERM CURRENT USE OF INSULIN (HCC): Primary | ICD-10-CM

## 2020-12-10 DIAGNOSIS — Z78.0 MENOPAUSE: ICD-10-CM

## 2020-12-10 PROCEDURE — 1090F PRES/ABSN URINE INCON ASSESS: CPT | Performed by: NURSE PRACTITIONER

## 2020-12-10 PROCEDURE — G8428 CUR MEDS NOT DOCUMENT: HCPCS | Performed by: NURSE PRACTITIONER

## 2020-12-10 PROCEDURE — G8432 DEP SCR NOT DOC, RNG: HCPCS | Performed by: NURSE PRACTITIONER

## 2020-12-10 PROCEDURE — 1101F PT FALLS ASSESS-DOCD LE1/YR: CPT | Performed by: NURSE PRACTITIONER

## 2020-12-10 PROCEDURE — 99213 OFFICE O/P EST LOW 20 MIN: CPT | Performed by: NURSE PRACTITIONER

## 2020-12-10 PROCEDURE — G8400 PT W/DXA NO RESULTS DOC: HCPCS | Performed by: NURSE PRACTITIONER

## 2020-12-10 RX ORDER — METFORMIN HYDROCHLORIDE 850 MG/1
TABLET ORAL
Qty: 180 TAB | Refills: 0 | Status: SHIPPED | OUTPATIENT
Start: 2020-12-10 | End: 2021-03-10

## 2020-12-10 RX ORDER — LORAZEPAM 0.5 MG/1
0.5 TABLET ORAL
Qty: 30 TAB | Refills: 0 | Status: SHIPPED | OUTPATIENT
Start: 2020-12-10

## 2020-12-10 NOTE — PROGRESS NOTES
Danyel Villavicencio is a 81 y.o. female who was seen by synchronous (real-time) audio-video technology on 12/10/2020 for Diabetes (follow-up)        Assessment & Plan:   Diagnoses and all orders for this visit:    1. Controlled type 2 diabetes mellitus with complication, without long-term current use of insulin (HCC)  -     metFORMIN (GLUCOPHAGE) 850 mg tablet; TAKE 1 TABLET BY MOUTH TWICE DAILY    2. Menopause    3. Mixed hyperlipidemia    4. Panic attack  -     LORazepam (ATIVAN) 0.5 mg tablet; Take 1 Tab by mouth daily as needed for Anxiety. Indications: anxious, history of panic attacks            Subjective: The patient presents for an Audio-visual teleconference appointment for routine follow-up care. She carries a medical diagnosis for the diabetes, panic attacks,depression, overactive bladder, dyslipidemia. Denies she feels well today. She has been prescribed lorazepam for multiple years by her prior provider. Per the patient she has a previous history of panic attacks. She notes she takes Ativan as needed. Chronic left shoulder pain-patient notes she has ongoing shoulder pain. She was referred to orthopedic surgeon to her visit on October 6, 2020 last office visit with Dr. Gatito Franklin was on August 28, 2020. Diabetes mellitus-patient is requesting a prescription for Metformin. Her last hemoglobin A1c was in January, 2020 and her hemoglobin A1c was 6.1. Prior to Admission medications    Medication Sig Start Date End Date Taking? Authorizing Provider   metFORMIN (GLUCOPHAGE) 850 mg tablet TAKE 1 TABLET BY MOUTH TWICE DAILY 12/10/20  Yes Ed Jones NP   LORazepam (ATIVAN) 0.5 mg tablet Take 1 Tab by mouth daily as needed for Anxiety.  Indications: anxious, history of panic attacks 12/10/20  Yes Ed HERRERA NP   pravastatin (PRAVACHOL) 10 mg tablet TAKE 1 TABLET BY MOUTH EVERY EVENING 10/6/20  Yes Ed Jones NP   mirtazapine (REMERON) 7.5 mg tablet Take 1 Tab by mouth nightly. 10/6/20  Yes Pasquale Alfred NP   sertraline (ZOLOFT) 50 mg tablet TAKE 1 TABLET BY MOUTH EVERY DAY 9/17/20  Yes Pasquale Alfred NP   mirabegron ER (MYRBETRIQ) 25 mg ER tablet Take 1 Tab by mouth daily. 9/17/19  Yes Pasquale Alfred NP   flash glucose scanning reader (FREESTYLE IZZY 14 DAY READER) misc 1 Each by Does Not Apply route two (2) times a day. 8/13/19  Yes Pasquale Alfred NP   flash glucose sensor (FREESTYLE IZZY 14 DAY SENSOR) kit 1 Each by Does Not Apply route two (2) times a day. 8/13/19  Yes Pasquale Alfred NP   glucose blood VI test strips (ASCENSIA AUTODISC VI, ONE TOUCH ULTRA TEST VI) strip 1 Each. 10/27/14  Yes Provider, Historical   Blood-Glucose Meter misc by Misc. (Non-Drug; Combo Route) route. Yes Provider, Historical   cyanocobalamin 1,000 mcg tablet 1,000 mcg. Yes Provider, Historical   Glucosamine HCl 1,500 mg tab 1,500 mg. Yes Provider, Historical   lancets misc 1 Each. 10/27/14  Yes Provider, Historical   meclizine (ANTIVERT) 25 mg tablet 25 mg. Yes Provider, Historical   omega 3-dha-epa-fish oil (FISH OIL) 60- mg cap 500 mg. Yes Provider, Historical   peg 400-propylene glycol (SYSTANE) 0.4-0.3 % drop Instill  in eye. 10/27/14  Yes Provider, Historical   vitamins a & d cap Take  by Mouth Once a Day.    Yes Provider, Historical     Patient Active Problem List   Diagnosis Code    Controlled type 2 diabetes mellitus with complication, without long-term current use of insulin ([address] E11.8    Depression F32.9    Hypercholesterolemia E78.00    Panic attack F41.0    Mixed hyperlipidemia E78.2    Overactive bladder N32.81     Patient Active Problem List    Diagnosis Date Noted    Panic attack 12/22/2020    Mixed hyperlipidemia 12/22/2020    Controlled type 2 diabetes mellitus with complication, without long-term current use of insulin ([address]     Depression     Hypercholesterolemia     Overactive bladder      Current Outpatient Medications   Medication Sig Dispense Refill    metFORMIN (GLUCOPHAGE) 850 mg tablet TAKE 1 TABLET BY MOUTH TWICE DAILY 180 Tab 0    LORazepam (ATIVAN) 0.5 mg tablet Take 1 Tab by mouth daily as needed for Anxiety. Indications: anxious, history of panic attacks 30 Tab 0    pravastatin (PRAVACHOL) 10 mg tablet TAKE 1 TABLET BY MOUTH EVERY EVENING 90 Tab 1    mirtazapine (REMERON) 7.5 mg tablet Take 1 Tab by mouth nightly. 30 Tab 1    sertraline (ZOLOFT) 50 mg tablet TAKE 1 TABLET BY MOUTH EVERY DAY 90 Tab 0    mirabegron ER (MYRBETRIQ) 25 mg ER tablet Take 1 Tab by mouth daily. 30 Tab 3    flash glucose scanning reader (FREESTYLE IZZY 14 DAY READER) misc 1 Each by Does Not Apply route two (2) times a day. 30 Each 2    flash glucose sensor (FREESTYLE IZZY 14 DAY SENSOR) kit 1 Each by Does Not Apply route two (2) times a day. 1 Kit 1    glucose blood VI test strips (ASCENSIA AUTODISC VI, ONE TOUCH ULTRA TEST VI) strip 1 Each.  Blood-Glucose Meter misc by Misc. (Non-Drug; Combo Route) route.  cyanocobalamin 1,000 mcg tablet 1,000 mcg.  Glucosamine HCl 1,500 mg tab 1,500 mg.      lancets misc 1 Each.  meclizine (ANTIVERT) 25 mg tablet 25 mg.      omega 3-dha-epa-fish oil (FISH OIL) 60- mg cap 500 mg.  peg 400-propylene glycol (SYSTANE) 0.4-0.3 % drop Instill  in eye.  vitamins a & d cap Take  by Mouth Once a Day. No Known Allergies  Past Medical History:   Diagnosis Date    Depression     Diabetes ([facility] [address] [facility]     Hypercholesterolemia        ROS    ,Constitutional: No apparent distress noted  General- negative for fever, chills or fatigue  Eyes- negative visual changes  CV- denies chest pain, palpitation  Pul: negative cough or SOB  GI: negative nausea, flank pain, diarrhea, constipation  Urinary:- No dysuria or polyuria  MS-chronic right shoulder pain  Neuro- negative headache, dizziness or weakness  Skin- negative for rashes or lesions.   Psych-depression and panic attack    Objective:   No flowsheet data found. [INSTRUCTIONS:  \"[x]\" Indicates a positive item  \"[]\" Indicates a negative item  -- DELETE ALL ITEMS NOT EXAMINED]    Constitutional: [x] Appears well-developed and well-nourished [x] No apparent distress      [] Abnormal -     Mental status: [x] Alert and awake  [x] Oriented to person/place/time [x] Able to follow commands    [] Abnormal -     Eyes:   EOM    [x]  Normal    [] Abnormal -   Sclera  [x]  Normal    [] Abnormal -          Discharge [x]  None visible   [] Abnormal -     HENT: [x] Normocephalic, atraumatic  [] Abnormal -   [x] Mouth/Throat: Mucous membranes are moist    External Ears [x] Normal  [] Abnormal -    Neck: [x] No visualized mass [] Abnormal -     Pulmonary/Chest: [x] Respiratory effort normal   [x] No visualized signs of difficulty breathing or respiratory distress        [] Abnormal -      Musculoskeletal:   [x] Normal gait with no signs of ataxia         [x] Normal range of motion of neck        [] Abnormal -     Neurological:        [x] No Facial Asymmetry (Cranial nerve 7 motor function) (limited exam due to video visit)          [x] No gaze palsy        [] Abnormal -          Skin:        [x] No significant exanthematous lesions or discoloration noted on facial skin         [] Abnormal -            Psychiatric:       [x] Normal Affect [] Abnormal -        [x] No Hallucinations    Other pertinent observable physical exam findings:-        We discussed the expected course, resolution and complications of the diagnosis(es) in detail. Medication risks, benefits, costs, interactions, and alternatives were discussed as indicated. I advised her to contact the office if her condition worsens, changes or fails to improve as anticipated. She expressed understanding with the diagnosis(es) and plan.        Danyel Villavicencio, who was evaluated through a patient-initiated, synchronous (real-time) audio-video encounter, and/or her healthcare decision maker, is aware that it is a billable service, with coverage as determined by her insurance carrier. She provided verbal consent to proceed: Yes, and patient identification was verified. It was conducted pursuant to the emergency declaration under the [address] [place] [address], [address] [place] [address] authority and the [employer] and [employer] General Act. A caregiver was present when appropriate. Ability to conduct physical exam was limited. I was at home. The patient was at home.       Lily Reid, NP

## 2020-12-10 NOTE — PROGRESS NOTES
[place] presents today for   Chief Complaint   Patient presents with    Diabetes     follow-up       Virtual/telephone visit    Depression Screening:  3 most recent PHQ Screens 12/10/2020   Little interest or pleasure in doing things More than half the days   Feeling down, depressed, irritable, or hopeless Nearly every day   Total Score PHQ 2 5   Trouble falling or staying asleep, or sleeping too much More than half the days   Feeling tired or having little energy Not at all   Poor appetite, weight loss, or overeating More than half the days   Feeling bad about yourself - or that you are a failure or have let yourself or your family down More than half the days   Trouble concentrating on things such as school, work, reading, or watching TV More than half the days   Moving or speaking so slowly that other people could have noticed; or the opposite being so fidgety that others notice Not at all   Thoughts of being better off dead, or hurting yourself in some way Not at all   PHQ 9 Score 13   How difficult have these problems made it for you to do your work, take care of your home and get along with others Not difficult at all       Learning Assessment:  No flowsheet data found. Abuse Screening:  Abuse Screening Questionnaire 12/10/2020   Do you ever feel afraid of your partner? N   Are you in a relationship with someone who physically or mentally threatens you? N   Is it safe for you to go home? Y       Fall Risk  Fall Risk Assessment, last 12 mths 12/10/2020   Able to walk? Yes   Fall in past 12 months? No         Health Maintenance reviewed and discussed and ordered per Provider.     Health Maintenance Due   Topic Date Due    Foot Exam Q1  10/24/1945    Eye Exam Retinal or Dilated  10/24/1945    DTaP/Tdap/Td series (1 - Tdap) 10/24/1957    Shingrix Vaccine Age 50> (1 of 2) 10/24/1986    Bone Densitometry (Dexa) Screening  10/24/2001    Pneumococcal 65+ years (1 of 1 - PPSV23) 10/24/2001    Flu Vaccine (1) 09/01/2020    MICROALBUMIN Q1  09/16/2020    Lipid Screen  09/16/2020   . Coordination of Care:  1. Have you been to the ER, urgent care clinic since your last visit? Hospitalized since your last visit? no    2. Have you seen or consulted any other health care providers outside of the [address] [place] [address] since your last visit? Include any pap smears or colon screening.  no

## 2020-12-22 PROBLEM — E78.2 MIXED HYPERLIPIDEMIA: Status: ACTIVE | Noted: 2020-12-22

## 2020-12-22 PROBLEM — F41.0 PANIC ATTACK: Status: ACTIVE | Noted: 2020-12-22

## 2021-02-04 ENCOUNTER — TRANSCRIBE ORDER (OUTPATIENT)
Dept: SCHEDULING | Age: 86
End: 2021-02-04

## 2021-02-04 DIAGNOSIS — Z12.31 VISIT FOR SCREENING MAMMOGRAM: Primary | ICD-10-CM

## 2021-04-13 ENCOUNTER — HOSPITAL ENCOUNTER (OUTPATIENT)
Dept: MAMMOGRAPHY | Age: 86
Discharge: HOME | End: 2021-04-13
Attending: NURSE PRACTITIONER
Payer: MEDICARE

## 2021-04-13 DIAGNOSIS — Z12.31 VISIT FOR SCREENING MAMMOGRAM: ICD-10-CM

## 2021-04-13 PROCEDURE — 77063 BREAST TOMOSYNTHESIS BI: CPT
